# Patient Record
Sex: MALE | Race: WHITE | NOT HISPANIC OR LATINO | URBAN - METROPOLITAN AREA
[De-identification: names, ages, dates, MRNs, and addresses within clinical notes are randomized per-mention and may not be internally consistent; named-entity substitution may affect disease eponyms.]

---

## 2022-12-16 ENCOUNTER — INPATIENT (INPATIENT)
Facility: HOSPITAL | Age: 72
LOS: 4 days | Discharge: NURSING, REHAB OR LONG-TERM CARE | End: 2022-12-21
Attending: STUDENT IN AN ORGANIZED HEALTH CARE EDUCATION/TRAINING PROGRAM | Admitting: STUDENT IN AN ORGANIZED HEALTH CARE EDUCATION/TRAINING PROGRAM
Payer: MEDICARE

## 2022-12-16 VITALS
SYSTOLIC BLOOD PRESSURE: 165 MMHG | TEMPERATURE: 98 F | RESPIRATION RATE: 20 BRPM | HEIGHT: 65 IN | OXYGEN SATURATION: 96 % | HEART RATE: 83 BPM | DIASTOLIC BLOOD PRESSURE: 110 MMHG | WEIGHT: 250 LBS

## 2022-12-16 LAB
ALBUMIN SERPL ELPH-MCNC: 3.6 G/DL — SIGNIFICANT CHANGE UP (ref 3.5–5.2)
ALP SERPL-CCNC: 125 U/L — HIGH (ref 30–115)
ALT FLD-CCNC: 37 U/L — SIGNIFICANT CHANGE UP (ref 0–41)
ANION GAP SERPL CALC-SCNC: 8 MMOL/L — SIGNIFICANT CHANGE UP (ref 7–14)
ANISOCYTOSIS BLD QL: SLIGHT — SIGNIFICANT CHANGE UP
APTT BLD: 33.9 SEC — SIGNIFICANT CHANGE UP (ref 27–39.2)
AST SERPL-CCNC: 43 U/L — HIGH (ref 0–41)
BASOPHILS # BLD AUTO: 0 K/UL — SIGNIFICANT CHANGE UP (ref 0–0.2)
BASOPHILS NFR BLD AUTO: 0 % — SIGNIFICANT CHANGE UP (ref 0–1)
BILIRUB SERPL-MCNC: 0.8 MG/DL — SIGNIFICANT CHANGE UP (ref 0.2–1.2)
BUN SERPL-MCNC: 15 MG/DL — SIGNIFICANT CHANGE UP (ref 10–20)
CALCIUM SERPL-MCNC: 8.7 MG/DL — SIGNIFICANT CHANGE UP (ref 8.4–10.4)
CHLORIDE SERPL-SCNC: 99 MMOL/L — SIGNIFICANT CHANGE UP (ref 98–110)
CO2 SERPL-SCNC: 27 MMOL/L — SIGNIFICANT CHANGE UP (ref 17–32)
CREAT SERPL-MCNC: 1.1 MG/DL — SIGNIFICANT CHANGE UP (ref 0.7–1.5)
DACRYOCYTES BLD QL SMEAR: SLIGHT — SIGNIFICANT CHANGE UP
EGFR: 72 ML/MIN/1.73M2 — SIGNIFICANT CHANGE UP
EOSINOPHIL # BLD AUTO: 0.55 K/UL — SIGNIFICANT CHANGE UP (ref 0–0.7)
EOSINOPHIL NFR BLD AUTO: 5.3 % — SIGNIFICANT CHANGE UP (ref 0–8)
ETHANOL SERPL-MCNC: <10 MG/DL — SIGNIFICANT CHANGE UP
GIANT PLATELETS BLD QL SMEAR: PRESENT — SIGNIFICANT CHANGE UP
GLUCOSE SERPL-MCNC: 151 MG/DL — HIGH (ref 70–99)
HCT VFR BLD CALC: 43.4 % — SIGNIFICANT CHANGE UP (ref 42–52)
HGB BLD-MCNC: 15 G/DL — SIGNIFICANT CHANGE UP (ref 14–18)
INR BLD: 1.29 RATIO — SIGNIFICANT CHANGE UP (ref 0.65–1.3)
LACTATE SERPL-SCNC: 1.9 MMOL/L — SIGNIFICANT CHANGE UP (ref 0.7–2)
LIDOCAIN IGE QN: 99 U/L — HIGH (ref 7–60)
LYMPHOCYTES # BLD AUTO: 0.92 K/UL — LOW (ref 1.2–3.4)
LYMPHOCYTES # BLD AUTO: 8.8 % — LOW (ref 20.5–51.1)
MACROCYTES BLD QL: SLIGHT — SIGNIFICANT CHANGE UP
MANUAL SMEAR VERIFICATION: SIGNIFICANT CHANGE UP
MCHC RBC-ENTMCNC: 34.6 G/DL — SIGNIFICANT CHANGE UP (ref 32–37)
MCHC RBC-ENTMCNC: 35 PG — HIGH (ref 27–31)
MCV RBC AUTO: 101.2 FL — HIGH (ref 80–94)
MONOCYTES # BLD AUTO: 1.09 K/UL — HIGH (ref 0.1–0.6)
MONOCYTES NFR BLD AUTO: 10.5 % — HIGH (ref 1.7–9.3)
MYELOCYTES NFR BLD: 0.9 % — HIGH (ref 0–0)
NEUTROPHILS # BLD AUTO: 7.57 K/UL — HIGH (ref 1.4–6.5)
NEUTROPHILS NFR BLD AUTO: 70.2 % — SIGNIFICANT CHANGE UP (ref 42.2–75.2)
NEUTS BAND # BLD: 2.6 % — SIGNIFICANT CHANGE UP (ref 0–6)
NRBC # BLD: 2 /100 — HIGH (ref 0–0)
NRBC # BLD: SIGNIFICANT CHANGE UP /100 WBCS (ref 0–0)
PLAT MORPH BLD: NORMAL — SIGNIFICANT CHANGE UP
PLATELET # BLD AUTO: 112 K/UL — LOW (ref 130–400)
POTASSIUM SERPL-MCNC: 5 MMOL/L — SIGNIFICANT CHANGE UP (ref 3.5–5)
POTASSIUM SERPL-SCNC: 5 MMOL/L — SIGNIFICANT CHANGE UP (ref 3.5–5)
PROT SERPL-MCNC: 7.9 G/DL — SIGNIFICANT CHANGE UP (ref 6–8)
PROTHROM AB SERPL-ACNC: 14.8 SEC — HIGH (ref 9.95–12.87)
RBC # BLD: 4.29 M/UL — LOW (ref 4.7–6.1)
RBC # FLD: 13.1 % — SIGNIFICANT CHANGE UP (ref 11.5–14.5)
RBC BLD AUTO: ABNORMAL
SODIUM SERPL-SCNC: 134 MMOL/L — LOW (ref 135–146)
VARIANT LYMPHS # BLD: 1.7 % — SIGNIFICANT CHANGE UP (ref 0–5)
WBC # BLD: 10.4 K/UL — SIGNIFICANT CHANGE UP (ref 4.8–10.8)
WBC # FLD AUTO: 10.4 K/UL — SIGNIFICANT CHANGE UP (ref 4.8–10.8)

## 2022-12-16 PROCEDURE — 70450 CT HEAD/BRAIN W/O DYE: CPT | Mod: 26,MA

## 2022-12-16 PROCEDURE — 72125 CT NECK SPINE W/O DYE: CPT | Mod: 26,MA

## 2022-12-16 PROCEDURE — 73060 X-RAY EXAM OF HUMERUS: CPT | Mod: 26,RT

## 2022-12-16 PROCEDURE — 99285 EMERGENCY DEPT VISIT HI MDM: CPT | Mod: 57

## 2022-12-16 PROCEDURE — 99284 EMERGENCY DEPT VISIT MOD MDM: CPT

## 2022-12-16 PROCEDURE — 73100 X-RAY EXAM OF WRIST: CPT | Mod: 26,LT

## 2022-12-16 PROCEDURE — 73080 X-RAY EXAM OF ELBOW: CPT | Mod: 26,LT

## 2022-12-16 PROCEDURE — 71045 X-RAY EXAM CHEST 1 VIEW: CPT | Mod: 26

## 2022-12-16 PROCEDURE — 73090 X-RAY EXAM OF FOREARM: CPT | Mod: 26,LT

## 2022-12-16 PROCEDURE — 73130 X-RAY EXAM OF HAND: CPT | Mod: 26,LT

## 2022-12-16 PROCEDURE — 73030 X-RAY EXAM OF SHOULDER: CPT | Mod: 26,RT

## 2022-12-16 PROCEDURE — 25605 CLTX DST RDL FX/EPHYS SEP W/: CPT | Mod: 54

## 2022-12-16 PROCEDURE — 71260 CT THORAX DX C+: CPT | Mod: 26,MA

## 2022-12-16 PROCEDURE — 74177 CT ABD & PELVIS W/CONTRAST: CPT | Mod: 26,MA

## 2022-12-16 RX ORDER — PANTOPRAZOLE SODIUM 20 MG/1
1 TABLET, DELAYED RELEASE ORAL
Qty: 0 | Refills: 0 | DISCHARGE

## 2022-12-16 RX ORDER — RIFAXIMIN 200 MG/1
1 TABLET ORAL
Qty: 0 | Refills: 0 | DISCHARGE

## 2022-12-16 RX ORDER — LACTULOSE 10 G/15ML
30 SOLUTION ORAL
Qty: 0 | Refills: 0 | DISCHARGE

## 2022-12-16 RX ORDER — FUROSEMIDE 40 MG
1 TABLET ORAL
Qty: 0 | Refills: 0 | DISCHARGE

## 2022-12-16 RX ORDER — LEVOTHYROXINE SODIUM 125 MCG
1 TABLET ORAL
Qty: 0 | Refills: 0 | DISCHARGE

## 2022-12-16 RX ORDER — MORPHINE SULFATE 50 MG/1
8 CAPSULE, EXTENDED RELEASE ORAL ONCE
Refills: 0 | Status: DISCONTINUED | OUTPATIENT
Start: 2022-12-16 | End: 2022-12-16

## 2022-12-16 RX ORDER — CARVEDILOL PHOSPHATE 80 MG/1
1 CAPSULE, EXTENDED RELEASE ORAL
Qty: 0 | Refills: 0 | DISCHARGE

## 2022-12-16 RX ADMIN — MORPHINE SULFATE 8 MILLIGRAM(S): 50 CAPSULE, EXTENDED RELEASE ORAL at 21:13

## 2022-12-16 NOTE — ED PROVIDER NOTE - CARE PLAN
1 Principal Discharge DX:	Colles' fracture  Secondary Diagnosis:	Fracture of right humerus   Principal Discharge DX:	Colles' fracture  Secondary Diagnosis:	Fracture of right humerus  Secondary Diagnosis:	Inadequate social support

## 2022-12-16 NOTE — CONSULT NOTE ADULT - ASSESSMENT
ASSESSMENT:  72y Male  w/ PMHx of *** seen as (Code Trauma / Trauma Alert / Trauma Consult) s/p ****** with complaint of *** , external signs of trauma include *** . Trauma assessment in ED: ABCs intact , GCS 15 , AAOx3,  HU.     Injuries identified:   -   -   -     PLAN:   - Trauma Labs: (CBC, BMP, Coags, T&S, UA, EtOH level)  Additional studies:  EKG  Utox    Trauma Imaging to include the following:  - CXR, Pelvic Xray  - CT Head,  CT C-spine, CT Max/Face, CT Chest, CT Abd/Pelvis  - Extremity films: None    Additional consultations:  - Neurosurgery  - Orthopedics  - OMFS  - PT/Rehab/SW  - Hospitalist/Medicine     Disposition pending results of above labs and imaging  Above plan discussed with Trauma attending,  ***  , patient, patient family, and ED team  --------------------------------------------------------------------------------------  12-16-22 @ 21:01   72yM w/ PMHx of HTN, liver cirrhosis, gerd, hypothyroidism seen as Trauma Alert s/p +HT, -LOC, -AC with complaint of right shoulder pain and left wrist pain, external signs of trauma include: obvious deformity of left wrist, right shoulder and some dried blood to the left cheek. Trauma assessment in ED: ABCs intact, GCS 15, AAOx3,  HU.     Injuries identified:   - presumed right shoulder dislocation  - left wrist fracture    PLAN:   - Trauma Labs: (CBC, BMP, Coags, T&S, UA, EtOH level)  Additional studies:  EKG    Trauma Imaging to include the following: PAN SCAN  - CXR, Pelvic Xray  - CT Head,  CT C-spine, CT Chest, CT Abd/Pelvis  - Extremity films: R shoulder, L wrist    Additional consultations:      Disposition pending results of above labs and imaging  Above plan discussed with Trauma attending,  ***  , patient, patient family, and ED team  --------------------------------------------------------------------------------------  12-16-22 @ 21:01   72yM w/ PMHx of HTN, liver cirrhosis, gerd, hypothyroidism seen as Trauma Alert s/p +HT, -LOC, -AC with complaint of right shoulder pain and left wrist pain, external signs of trauma include: obvious deformity of left wrist, right shoulder and some dried blood to the left cheek. Trauma assessment in ED: ABCs intact, GCS 15, AAOx3    Injuries identified:   - Acute, impacted right humeral surgical neck comminuted fracture.  - left wrist fracture    PLAN:   - Trauma Labs: (CBC, BMP, Coags, T&S, UA, EtOH level)  Additional studies:  EKG    Trauma Imaging to include the following:   - CXR, Pelvic Xray  - CT Head,  CT C-spine, CT Chest, CT Abd/Pelvis  - Extremity films: R shoulder, L wrist    Additional consultations:  - ortho    Disposition pending results of above labs and imaging  Above plan discussed with Trauma attending, Dr. Mccarthy, patient, and ED team  --------------------------------------------------------------------------------------  12-16-22 @ 21:01   72yM w/ PMHx of HTN, liver cirrhosis, gerd, hypothyroidism seen as Trauma Alert s/p +HT, -LOC, -AC with complaint of right shoulder pain and left wrist pain, external signs of trauma include: obvious deformity of left wrist, right shoulder and some dried blood to the left cheek. Trauma assessment in ED: ABCs intact, GCS 15, AAOx3    Injuries identified:   - Acute, impacted right humeral surgical neck comminuted fracture.  - Left distal radius and distal ulna fracture    PLAN:   - Trauma Labs: (CBC, BMP, Coags, T&S, UA, EtOH level)  Additional studies:  EKG    Trauma Imaging to include the following:   - CXR, Pelvic Xray  - CT Head,  CT C-spine, CT Chest, CT Abd/Pelvis  - Extremity films: R shoulder, L wrist    Additional consultations:  - ortho    Disposition pending results of above labs and imaging  Above plan discussed with Trauma attending, Dr. Mccarthy, patient, and ED team  --------------------------------------------------------------------------------------  12-16-22 @ 21:01   72yM w/ PMHx of HTN, liver cirrhosis, gerd, hypothyroidism seen as Trauma Alert s/p +HT, -LOC, -AC with complaint of right shoulder pain and left wrist pain, external signs of trauma include: obvious deformity of left wrist, right shoulder and some dried blood to the left cheek. Trauma assessment in ED: ABCs intact, GCS 15, AAOx3    Injuries identified:   - Acute, impacted right humeral surgical neck comminuted fracture.  - Left Colles fracture    PLAN:   - Trauma Labs: (CBC, BMP, Coags, T&S, UA, EtOH level)  Additional studies:  EKG    Trauma Imaging to include the following:   - CXR, Pelvic Xray  - CT Head,  CT C-spine, CT Chest, CT Abd/Pelvis  - Extremity films: R shoulder, L wrist    Additional consultations:  - ortho    Disposition pending results of above labs and imaging  Above plan discussed with Trauma attending, Dr. Mccarthy, patient, and ED team  --------------------------------------------------------------------------------------  12-16-22 @ 21:01   72yM w/ PMHx of HTN, liver cirrhosis, gerd, hypothyroidism seen as Trauma Alert s/p +HT, -LOC, -AC with complaint of right shoulder pain and left wrist pain, external signs of trauma include: obvious deformity of left wrist, right shoulder and some dried blood to the left cheek. Trauma assessment in ED: ABCs intact, GCS 15, AAOx3    Injuries identified:   - Acute, impacted right humeral surgical neck comminuted fracture.  - Left Colles fracture    PLAN:   CTs reviewed, as above.  No acute trauma surgical intervention, nonoperative management of left and right upper extremity fractures.  Plain film wet reads as per ED  Dispo as per ED  If pt admitted, trauma team to perform TTS in AM    Above plan discussed with Trauma attending, Dr. Mccarthy, patient, and ED team

## 2022-12-16 NOTE — ED PROVIDER NOTE - ATTENDING CONTRIBUTION TO CARE
I personally evaluated patient. I agree with the findings and plan with all documentation on chart except as documented  in my note.    72-year-old male past medical history of cirrhosis secondary to prior alcohol abuse and dependence, hypertension, hypothyroidism who presents to the emergency department as a prior arrival trauma notification for fall.  Trauma alert activated prior to arrival.  Patient was bringing the garbage out when he tripped forward and fell on his right shoulder and left outstretched hand.  Patient had left wrist deformity and right upper arm deformity and EMS was called.  He denies any loss of consciousness.  Additional history obtained from EMS and patient had life alert button.  More history later taken from patient's daughter.    Patient brought to trauma critical care ED.  Vital signs reviewed.  GCS 15.  Primary survey is intact.  Secondary survey shows right upper arm shortening and tenderness to surgical neck of humerus.  Patient also has Colles' fracture to the left distal forearm.  Patient is neurovascular intact throughout and has normal pulses and cap refill, sensation.  Remainder secondary survey negative for any serious injuries.  Patient had full trauma work-up and appropriate x-rays performed.  Trauma work-up consistent with right humeral neck fracture, for which patient had sling applied.  Patient also has a left Colles' fracture that was significantly compacted and displaced.  This was reduced as described in the emergency department and patient splinted.  Repeat x-rays performed and showed significant improvement in alignment of fracture.  Case discussed with trauma.  Patient's daughter spoken to and reports patient is unsafe to be at home at this time.  Patient has injuries to bilateral upper extremities and cannot take care of himself.  Daughter works long hours and is not home for patient and cannot help patient will require social admission for possible placement and social work evaluation.  Blood work reviewed.  Orthopedics consulted for care and patient has nonoperative shoulder fracture.  Patient cleared from trauma for admission to medicine.  Daughter spoken to about results and plan of care and patient's medications updated.

## 2022-12-16 NOTE — CONSULT NOTE ADULT - SUBJECTIVE AND OBJECTIVE BOX
TRAUMA ACTIVATION LEVEL: ALERT  ACTIVATED BY: EMS  INTUBATED: NO    MECHANISM OF INJURY: [X] Fall	    GCS: 15 	E: 4	V: 5	M: 6    HPI:  72yM w/ PMHx of HTN, liver cirrhosis, chf, gerd, hypothyroidism seen as Trauma Alert s/p -HT, -LOC, -AC.  Trauma assessment in ED: ABCs intact , GCS 15 , AAOx3. Patient is complaining of right shoulder pain and left wrist pain. Obvious deformity of left wrist.    PAST MEDICAL & SURGICAL HISTORY:    Allergies  No Known Allergies    Home Medications:  Carvedilol  spirinolactone  pantoprazole  vit d3  furosemide  levothyroxine  lactulose    ROS: 10-system review is otherwise negative except HPI above.      Primary Survey:    A - airway intact  B - bilateral breath sounds and good chest rise  C - palpable pulses in all extremities  D - GCS 15 on arrival, HU  Exposure obtained    Vital Signs Last 24 Hrs  T(C): --  T(F): --  HR: --  BP: --  BP(mean): --  RR: --  SpO2: --    Secondary Survey:   General: NAD  HEENT: Normocephalic, atraumatic, EOMI, PEERLA. no scalp lacerations   Neck: Soft, midline trachea. no c-spine tenderness  Chest: Mild left chest wall tenderness. No subcutaneous emphysema   Cardiac: S1, S2  Respiratory: Bilateral breath sounds, clear and equal bilaterally  Abdomen: Soft, non-distended, non-tender, no rebound, no guarding.  Groin: Normal appearing, pelvis stable   Ext:  Sensation and pulses intact in all 4 extremities.   Back: No T/L/S spine tenderness, No palpable runoff/stepoff/deformity  Rectal: No bessy blood, SHLOMO with good tone    Labs:  CAPILLARY BLOOD GLUCOSE  POCT Blood Glucose.: 153 mg/dL (16 Dec 2022 20:59)      RADIOLOGY & ADDITIONAL STUDIES: TRAUMA ACTIVATION LEVEL: ALERT  ACTIVATED BY: EMS  INTUBATED: NO    MECHANISM OF INJURY: [X] Fall	    GCS: 15 	E: 4	V: 5	M: 6    HPI:  72yM w/ PMHx of HTN, liver cirrhosis, gerd, hypothyroidism seen as Trauma Alert s/p +HT, -LOC, -AC.  Trauma assessment in ED: ABCs intact , GCS 15 , AAOx3. Patient is complaining of right shoulder pain and left wrist pain. Obvious deformity of left wrist. Patient states he had a mechanical trip and fall while taking out the trash. He was able to press his life alert and EMS came. He was unable to stand on his own but states he usually walks with a cane by himself at home.     PAST MEDICAL & SURGICAL HISTORY:    Allergies  No Known Allergies    Home Medications:  Carvedilol  spirinolactone  pantoprazole  vit d3  furosemide  levothyroxine  lactulose      ROS: 10-system review is otherwise negative except HPI above.      Primary Survey:    A - airway intact  B - bilateral breath sounds and good chest rise  C - palpable pulses in all extremities  D - GCS 15 on arrival, HU  Exposure obtained    Vital Signs Last 24 Hrs  T(C): 36.4 (16 Dec 2022 21:09), Max: 36.4 (16 Dec 2022 21:09)  T(F): 97.5 (16 Dec 2022 21:09), Max: 97.5 (16 Dec 2022 21:09)  HR: 87 (16 Dec 2022 21:15) (83 - 87)  BP: 126/63 (16 Dec 2022 21:15) (126/63 - 165/110)  BP(mean): --  RR: 20 (16 Dec 2022 21:15) (20 - 20)  SpO2: 96% (16 Dec 2022 21:15) (96% - 96%)    Parameters below as of 16 Dec 2022 21:15  Patient On (Oxygen Delivery Method): room air      Secondary Survey:   General: NAD  HEENT: Normocephalic, atraumatic, EOMI, PEERLA. no scalp lacerations   Neck: Soft, midline trachea. no c-spine tenderness  Chest: Mild left chest wall tenderness. No subcutaneous emphysema   Cardiac: S1, S2  Respiratory: Bilateral breath sounds, clear and equal bilaterally  Abdomen: Soft, non-distended, non-tender, no rebound, no guarding.  Groin: Normal appearing, pelvis stable   Ext:  Sensation and pulses intact in all 4 extremities.   Back: No T/L/S spine tenderness, No palpable runoff/stepoff/deformity  Rectal: No bessy blood, SHLOMO with good tone    Labs:  CAPILLARY BLOOD GLUCOSE  POCT Blood Glucose.: 153 mg/dL (16 Dec 2022 20:59)                        15.0   10.40 )-----------( 112      ( 16 Dec 2022 21:14 )             43.4       RADIOLOGY & ADDITIONAL STUDIES:  PAN SCAN pending TRAUMA ACTIVATION LEVEL: ALERT  ACTIVATED BY: EMS  INTUBATED: NO    MECHANISM OF INJURY: [X] Fall	    GCS: 15 	E: 4	V: 5	M: 6    HPI:  72yM w/ PMHx of HTN, liver cirrhosis 2/2 EtOH abuse states he quit drinking 5 years ago, GERD, hypothyroidism seen as Trauma Alert s/p +HT, -LOC, -AC.  Trauma assessment in ED: ABCs intact , GCS 15 , AAOx3. Patient is complaining of right shoulder pain and left wrist pain. Obvious deformity of left wrist. Patient states he had a mechanical trip and fall while taking out the trash. He was able to press his life alert and EMS came. He was unable to stand on his own but states he usually walks with a cane by himself at home.     PAST MEDICAL & SURGICAL HISTORY:  HTN   Liver cirrhosis 2/2 EtOH abuse states he quit drinking 5 years ago   GERD  Hypothyroidism    Allergies  No Known Allergies    Home Medications:  Carvedilol  spironolactone  pantoprazole  vit d3  furosemide  levothyroxine  lactulose    ROS: 10-system review is otherwise negative except HPI above.      Primary Survey:    A - airway intact  B - bilateral breath sounds and good chest rise  C - palpable pulses in all extremities  D - GCS 15 on arrival, HU  Exposure obtained    Vital Signs Last 24 Hrs  T(C): 36.4 (16 Dec 2022 21:09), Max: 36.4 (16 Dec 2022 21:09)  T(F): 97.5 (16 Dec 2022 21:09), Max: 97.5 (16 Dec 2022 21:09)  HR: 87 (16 Dec 2022 21:15) (83 - 87)  BP: 126/63 (16 Dec 2022 21:15) (126/63 - 165/110)  BP(mean): --  RR: 20 (16 Dec 2022 21:15) (20 - 20)  SpO2: 96% (16 Dec 2022 21:15) (96% - 96%)    Parameters below as of 16 Dec 2022 21:15  Patient On (Oxygen Delivery Method): room air    Secondary Survey:   General: NAD  HEENT: Normocephalic, atraumatic, EOMI, PEERLA. no scalp lacerations   Neck: Soft, midline trachea. no c-spine tenderness  Chest: Mild left chest wall tenderness. No subcutaneous emphysema   Cardiac: S1, S2  Respiratory: Bilateral breath sounds, clear and equal bilaterally  Abdomen: Soft, non-distended, non-tender, no rebound, no guarding.  Groin: Normal appearing, pelvis stable   Ext:  Sensation and pulses intact in all 4 extremities.   Back: No T/L/S spine tenderness, No palpable runoff/stepoff/deformity  Rectal: No bessy blood, SHLOMO with good tone      LABS:  CAPILLARY BLOOD GLUCOSE  POCT Blood Glucose.: 153 mg/dL (16 Dec 2022 20:59)                      15.0   10.40 )-----------( 112      ( 16 Dec 2022 21:14 )             43.4     12-16  134<L>  |  99  |  15  ----------------------------<  151<H>  5.0   |  27  |  1.1    Ca    8.7      16 Dec 2022 21:14    TPro  7.9  /  Alb  3.6  /  TBili  0.8  /  DBili  x   /  AST  43<H>  /  ALT  37  /  AlkPhos  125<H>  12-16    Lipase, Serum: 99 U/L (12-16-22 @ 21:14)    Lactate, Blood: 1.9 mmol/L (12-16-22 @ 21:14)    PT/INR - ( 16 Dec 2022 21:14 )   PT: 14.80 sec;   INR: 1.29 ratio    PTT - ( 16 Dec 2022 21:14 )  PTT:33.9 sec    Alcohol, Blood: <10 mg/dL (12-16-22 @ 21:14)      RADIOLOGY & ADDITIONAL STUDIES:  PAN SCAN pending TRAUMA ACTIVATION LEVEL: ALERT  ACTIVATED BY: EMS  INTUBATED: NO    MECHANISM OF INJURY: [X] Fall	    GCS: 15 	E: 4	V: 5	M: 6    HPI:  72yM w/ PMHx of HTN, liver cirrhosis 2/2 EtOH abuse states he quit drinking 5 years ago, GERD, hypothyroidism seen as Trauma Alert s/p +HT, -LOC, -AC.  Trauma assessment in ED: ABCs intact , GCS 15 , AAOx3. Patient is complaining of right shoulder pain and left wrist pain. Obvious deformity of left wrist. Patient states he had a mechanical trip and fall while taking out the trash. He was able to press his life alert and EMS came. He was unable to stand on his own but states he usually walks with a cane by himself at home.     PAST MEDICAL & SURGICAL HISTORY:  HTN   Liver cirrhosis 2/2 EtOH abuse states he quit drinking 5 years ago   GERD  Hypothyroidism    Allergies  No Known Allergies    Home Medications:  Carvedilol  spironolactone  pantoprazole  vit d3  furosemide  levothyroxine  lactulose    ROS: 10-system review is otherwise negative except HPI above.      Primary Survey:    A - airway intact  B - bilateral breath sounds and good chest rise  C - palpable pulses in all extremities  D - GCS 15 on arrival, HU  Exposure obtained    Vital Signs Last 24 Hrs  T(C): 36.4 (16 Dec 2022 21:09), Max: 36.4 (16 Dec 2022 21:09)  T(F): 97.5 (16 Dec 2022 21:09), Max: 97.5 (16 Dec 2022 21:09)  HR: 87 (16 Dec 2022 21:15) (83 - 87)  BP: 126/63 (16 Dec 2022 21:15) (126/63 - 165/110)  BP(mean): --  RR: 20 (16 Dec 2022 21:15) (20 - 20)  SpO2: 96% (16 Dec 2022 21:15) (96% - 96%)    Parameters below as of 16 Dec 2022 21:15  Patient On (Oxygen Delivery Method): room air    Secondary Survey:   General: NAD  HEENT: Normocephalic, atraumatic, EOMI, PEERLA. no scalp lacerations   Neck: Soft, midline trachea. no c-spine tenderness  Chest: Mild left chest wall tenderness. No subcutaneous emphysema   Cardiac: S1, S2  Respiratory: Bilateral breath sounds, clear and equal bilaterally  Abdomen: Soft, non-distended, non-tender, no rebound, no guarding.  Groin: Normal appearing, pelvis stable   Ext:  Sensation and pulses intact in all 4 extremities.   Back: No T/L/S spine tenderness, No palpable runoff/stepoff/deformity  Rectal: No bessy blood, SHLOMO with good tone      LABS:  CAPILLARY BLOOD GLUCOSE  POCT Blood Glucose.: 153 mg/dL (16 Dec 2022 20:59)                      15.0   10.40 )-----------( 112      ( 16 Dec 2022 21:14 )             43.4     12-16  134<L>  |  99  |  15  ----------------------------<  151<H>  5.0   |  27  |  1.1    Ca    8.7      16 Dec 2022 21:14    TPro  7.9  /  Alb  3.6  /  TBili  0.8  /  DBili  x   /  AST  43<H>  /  ALT  37  /  AlkPhos  125<H>  12-16    Lipase, Serum: 99 U/L (12-16-22 @ 21:14)    Lactate, Blood: 1.9 mmol/L (12-16-22 @ 21:14)    PT/INR - ( 16 Dec 2022 21:14 )   PT: 14.80 sec;   INR: 1.29 ratio    PTT - ( 16 Dec 2022 21:14 )  PTT:33.9 sec    Alcohol, Blood: <10 mg/dL (12-16-22 @ 21:14)      RADIOLOGY & ADDITIONAL STUDIES:  < from: CT Head No Cont (12.16.22 @ 22:02) >  Impression:  CT head:  No evidence of acute intracranial abnormality.  CT cervical spine:  No definite evidence of acute fracture of the cervical spine.  Osteopenia. C3-C7 mild vertebral body height loss, likely chronic.    --- End of Report ---  SOLO MAST MD; Attending Radiologist  This document has been electronically signed. Dec 16 2022 10:50PM  < end of copied text >    < from: CT Chest w/ IV Cont (12.16.22 @ 22:19) >  IMPRESSION:  1. Acute, impacted right humeral surgical neck comminuted fracture.  2. Osteopenia with age-indeterminate L1-L5, T12, T10, T7-T8, T3 vertebral body compression fractures; likely chronic.  3. No evidence of solid abdominal organ injury.  4. Liver cirrhosis with portal hypertension. Patent portal vein.    --- End of Report ---  SOLO MAST MD; Attending Radiologist  This document has been electronically signed. Dec 16 2022 10:39PM  < end of copied text > TRAUMA ACTIVATION LEVEL: ALERT  ACTIVATED BY: EMS  INTUBATED: NO    MECHANISM OF INJURY: [X] Fall	    GCS: 15 	E: 4	V: 5	M: 6    HPI:  72yM w/ PMHx of HTN, liver cirrhosis 2/2 EtOH abuse states he quit drinking 5 years ago, GERD, hypothyroidism seen as Trauma Alert s/p +HT, -LOC, -AC.  Trauma assessment in ED: ABCs intact , GCS 15 , AAOx3. Patient is complaining of right shoulder pain and left wrist pain. Obvious deformity of left wrist. Patient states he had a mechanical trip and fall while taking out the trash. He was able to press his life alert and EMS came. He was unable to stand on his own but states he usually walks with a cane by himself at home.     PAST MEDICAL & SURGICAL HISTORY:  HTN   Liver cirrhosis 2/2 EtOH abuse states he quit drinking 5 years ago   GERD  Hypothyroidism    Allergies  No Known Allergies    Home Medications:  Carvedilol  spironolactone  pantoprazole  vit d3  furosemide  levothyroxine  lactulose    ROS: 10-system review is otherwise negative except HPI above.      Primary Survey:    A - airway intact  B - bilateral breath sounds and good chest rise  C - palpable pulses in all extremities  D - GCS 15 on arrival, HU  Exposure obtained    Vital Signs Last 24 Hrs  T(C): 36.4 (16 Dec 2022 21:09), Max: 36.4 (16 Dec 2022 21:09)  T(F): 97.5 (16 Dec 2022 21:09), Max: 97.5 (16 Dec 2022 21:09)  HR: 87 (16 Dec 2022 21:15) (83 - 87)  BP: 126/63 (16 Dec 2022 21:15) (126/63 - 165/110)  BP(mean): --  RR: 20 (16 Dec 2022 21:15) (20 - 20)  SpO2: 96% (16 Dec 2022 21:15) (96% - 96%)    Parameters below as of 16 Dec 2022 21:15  Patient On (Oxygen Delivery Method): room air    Secondary Survey:   General: NAD  HEENT: Normocephalic, atraumatic, EOMI, PEERLA. no scalp lacerations   Neck: Soft, midline trachea. no c-spine tenderness  Chest: Mild left chest wall tenderness. No subcutaneous emphysema   Cardiac: S1, S2  Respiratory: Bilateral breath sounds, clear and equal bilaterally  Abdomen: Soft, non-distended, non-tender, no rebound, no guarding.  Groin: Normal appearing, pelvis stable   Ext:  Sensation and pulses intact in all 4 extremities.   Back: No T/L/S spine tenderness, No palpable runoff/stepoff/deformity  Rectal: No bessy blood, SHLOMO with good tone      LABS:  CAPILLARY BLOOD GLUCOSE  POCT Blood Glucose.: 153 mg/dL (16 Dec 2022 20:59)                      15.0   10.40 )-----------( 112      ( 16 Dec 2022 21:14 )             43.4     12-16  134<L>  |  99  |  15  ----------------------------<  151<H>  5.0   |  27  |  1.1    Ca    8.7      16 Dec 2022 21:14    TPro  7.9  /  Alb  3.6  /  TBili  0.8  /  DBili  x   /  AST  43<H>  /  ALT  37  /  AlkPhos  125<H>  12-16    Lipase, Serum: 99 U/L (12-16-22 @ 21:14)    Lactate, Blood: 1.9 mmol/L (12-16-22 @ 21:14)    PT/INR - ( 16 Dec 2022 21:14 )   PT: 14.80 sec;   INR: 1.29 ratio    PTT - ( 16 Dec 2022 21:14 )  PTT:33.9 sec    Alcohol, Blood: <10 mg/dL (12-16-22 @ 21:14)      RADIOLOGY & ADDITIONAL STUDIES:  < from: CT Head No Cont (12.16.22 @ 22:02) >  Impression:  CT head:  No evidence of acute intracranial abnormality.  CT cervical spine:  No definite evidence of acute fracture of the cervical spine.  Osteopenia. C3-C7 mild vertebral body height loss, likely chronic.    --- End of Report ---  SOLO MAST MD; Attending Radiologist  This document has been electronically signed. Dec 16 2022 10:50PM  < end of copied text >    < from: CT Chest w/ IV Cont (12.16.22 @ 22:19) >  IMPRESSION:  1. Acute, impacted right humeral surgical neck comminuted fracture.  2. Osteopenia with age-indeterminate L1-L5, T12, T10, T7-T8, T3 vertebral body compression fractures; likely chronic.  3. No evidence of solid abdominal organ injury.  4. Liver cirrhosis with portal hypertension. Patent portal vein.    --- End of Report ---  SOLO MAST MD; Attending Radiologist  This document has been electronically signed. Dec 16 2022 10:39PM  < end of copied text >      5-item FRAIL scale (Fatigue, Resistance, Ambulation, Illnesses, & Loss of Weight)  - Fatigue: How much time during the previous 4 weeks did you feel tired?   All or most of the time [  x ] Yes (1pt)    [  ] No  (0pts)  - Resistance: Do you have any difficulty walking up 10 steps alone without resting and without aids? [ x  ] Yes (1pt)    [  ] No  (0pts)  - Ambulation: Do you have any difficulty walking several hundred yards alone without aids? [ x  ] Yes (1pt)    [  ] No  (0pts)  - Illness: Do you have 5+ medical problems? [ x  ] 5 or more (1pt) [  ] < 5 (0pts)  (The total illnesses (0–11) are recoded as 0–4 = 0 and 5–11 = 1. The illnesses include hypertension, diabetes, cancer (other than a minor skin cancer), chronic lung disease, heart attack, congestive heart failure, angina, asthma, arthritis, stroke, and kidney disease.)    - Loss of weight: Have you had weight loss of 5% or more? [   ] Yes (1pt)    [ x ] No  (0pts)    Total Score: 4    Score 1-2: Consult medical comangement  Score 3-4: Consult Geriatric service   Score 5: Consult Palliative service [phone]    Gifty Godinez G, Jatin RIVERA, Lorie WARD, Zhanna B. Frality: toward a clinical definition. J [employer] Med Dir Assoc. 2008; 9 (2): 71-72  Gabriel JE, Zenon TK, Martinez DK. A simple frailty questionnaire (FRAIL) predicts outcomes in middle aged  Americans. J Nutr Health Aging. 2012; 16 (7): 601-608

## 2022-12-16 NOTE — ED ADULT TRIAGE NOTE - CHIEF COMPLAINT QUOTE
BIBA with complaints of S/P Fall, tripped and fell at home with right upper arm/shoulder and left wrist pain, no LOC, no blood thinners.

## 2022-12-16 NOTE — ED PROVIDER NOTE - CLINICAL SUMMARY MEDICAL DECISION MAKING FREE TEXT BOX
72-year-old male past medical history of cirrhosis secondary to prior alcohol abuse and dependence, hypertension, hypothyroidism who presents to the emergency department as a prior arrival trauma notification for fall.  Trauma alert activated prior to arrival.  Patient was bringing the garbage out when he tripped forward and fell on his right shoulder and left outstretched hand.  Patient had left wrist deformity and right upper arm deformity and EMS was called.  He denies any loss of consciousness.  Additional history obtained from EMS and patient had life alert button.  More history later taken from patient's daughter.    Patient brought to trauma critical care ED.  Vital signs reviewed.  GCS 15.  Primary survey is intact.  Secondary survey shows right upper arm shortening and tenderness to surgical neck of humerus.  Patient also has Colles' fracture to the left distal forearm.  Patient is neurovascular intact throughout and has normal pulses and cap refill, sensation.  Remainder secondary survey negative for any serious injuries.  Patient had full trauma work-up and appropriate x-rays performed.  Trauma work-up consistent with right humeral neck fracture, for which patient had sling applied.  Patient also has a left Colles' fracture that was significantly compacted and displaced.  This was reduced as described in the emergency department and patient splinted.  Repeat x-rays performed and showed significant improvement in alignment of fracture.  Case discussed with trauma.  Patient's daughter spoken to and reports patient is unsafe to be at home at this time.  Patient has injuries to bilateral upper extremities and cannot take care of himself.  Daughter works long hours and is not home for patient and cannot help patient will require social admission for possible placement and social work evaluation.  Blood work reviewed.  Orthopedics consulted for care and patient has nonoperative shoulder fracture.  Patient cleared from trauma for admission to medicine.  Daughter spoken to about results and plan of care and patient's medications updated.

## 2022-12-16 NOTE — ED PROVIDER NOTE - OBJECTIVE STATEMENT
Pt is a 71 y/o male with PMH of alcohol use disorder, cirrhosis, HTN, hypothyroidism and GERD BIBEMS for fall. Pt was at home in his yard when he tripped and fell. + head trauma, no LOC, no blood thinners. Pt is a 73 y/o male with PMH of alcohol use disorder (has been sober for ~5 yrs), cirrhosis, HTN, hypothyroidism and GERD BIBEMS for fall. Pt was at home in his yard when he tripped and fell. + head trauma, no LOC, no blood thinners. Reports pain to right shoulder and left wrist.

## 2022-12-17 LAB
ALBUMIN SERPL ELPH-MCNC: 3.4 G/DL — LOW (ref 3.5–5.2)
ALP SERPL-CCNC: 119 U/L — HIGH (ref 30–115)
ALT FLD-CCNC: 32 U/L — SIGNIFICANT CHANGE UP (ref 0–41)
ANION GAP SERPL CALC-SCNC: 10 MMOL/L — SIGNIFICANT CHANGE UP (ref 7–14)
AST SERPL-CCNC: 33 U/L — SIGNIFICANT CHANGE UP (ref 0–41)
BASOPHILS # BLD AUTO: 0.05 K/UL — SIGNIFICANT CHANGE UP (ref 0–0.2)
BASOPHILS NFR BLD AUTO: 0.4 % — SIGNIFICANT CHANGE UP (ref 0–1)
BILIRUB SERPL-MCNC: 1 MG/DL — SIGNIFICANT CHANGE UP (ref 0.2–1.2)
BUN SERPL-MCNC: 16 MG/DL — SIGNIFICANT CHANGE UP (ref 10–20)
CALCIUM SERPL-MCNC: 8.3 MG/DL — LOW (ref 8.4–10.5)
CHLORIDE SERPL-SCNC: 99 MMOL/L — SIGNIFICANT CHANGE UP (ref 98–110)
CO2 SERPL-SCNC: 25 MMOL/L — SIGNIFICANT CHANGE UP (ref 17–32)
CREAT SERPL-MCNC: 0.8 MG/DL — SIGNIFICANT CHANGE UP (ref 0.7–1.5)
EGFR: 95 ML/MIN/1.73M2 — SIGNIFICANT CHANGE UP
EOSINOPHIL # BLD AUTO: 0.02 K/UL — SIGNIFICANT CHANGE UP (ref 0–0.7)
EOSINOPHIL NFR BLD AUTO: 0.2 % — SIGNIFICANT CHANGE UP (ref 0–8)
GLUCOSE SERPL-MCNC: 129 MG/DL — HIGH (ref 70–99)
HCT VFR BLD CALC: 40.4 % — LOW (ref 42–52)
HGB BLD-MCNC: 14 G/DL — SIGNIFICANT CHANGE UP (ref 14–18)
IMM GRANULOCYTES NFR BLD AUTO: 0.4 % — HIGH (ref 0.1–0.3)
LYMPHOCYTES # BLD AUTO: 1.11 K/UL — LOW (ref 1.2–3.4)
LYMPHOCYTES # BLD AUTO: 8.7 % — LOW (ref 20.5–51.1)
MCHC RBC-ENTMCNC: 34.7 G/DL — SIGNIFICANT CHANGE UP (ref 32–37)
MCHC RBC-ENTMCNC: 35.3 PG — HIGH (ref 27–31)
MCV RBC AUTO: 101.8 FL — HIGH (ref 80–94)
MONOCYTES # BLD AUTO: 1.43 K/UL — HIGH (ref 0.1–0.6)
MONOCYTES NFR BLD AUTO: 11.3 % — HIGH (ref 1.7–9.3)
NEUTROPHILS # BLD AUTO: 10.05 K/UL — HIGH (ref 1.4–6.5)
NEUTROPHILS NFR BLD AUTO: 79 % — HIGH (ref 42.2–75.2)
NRBC # BLD: 0 /100 WBCS — SIGNIFICANT CHANGE UP (ref 0–0)
PLATELET # BLD AUTO: 110 K/UL — LOW (ref 130–400)
POTASSIUM SERPL-MCNC: 4 MMOL/L — SIGNIFICANT CHANGE UP (ref 3.5–5)
POTASSIUM SERPL-SCNC: 4 MMOL/L — SIGNIFICANT CHANGE UP (ref 3.5–5)
PROT SERPL-MCNC: 7.3 G/DL — SIGNIFICANT CHANGE UP (ref 6–8)
RBC # BLD: 3.97 M/UL — LOW (ref 4.7–6.1)
RBC # FLD: 13.1 % — SIGNIFICANT CHANGE UP (ref 11.5–14.5)
SARS-COV-2 RNA SPEC QL NAA+PROBE: SIGNIFICANT CHANGE UP
SODIUM SERPL-SCNC: 134 MMOL/L — LOW (ref 135–146)
WBC # BLD: 12.71 K/UL — HIGH (ref 4.8–10.8)
WBC # FLD AUTO: 12.71 K/UL — HIGH (ref 4.8–10.8)

## 2022-12-17 PROCEDURE — 73110 X-RAY EXAM OF WRIST: CPT | Mod: 26,LT,77

## 2022-12-17 PROCEDURE — 99223 1ST HOSP IP/OBS HIGH 75: CPT

## 2022-12-17 PROCEDURE — 73110 X-RAY EXAM OF WRIST: CPT | Mod: 26,LT

## 2022-12-17 RX ORDER — FUROSEMIDE 40 MG
20 TABLET ORAL DAILY
Refills: 0 | Status: DISCONTINUED | OUTPATIENT
Start: 2022-12-17 | End: 2022-12-21

## 2022-12-17 RX ORDER — SPIRONOLACTONE 25 MG/1
1 TABLET, FILM COATED ORAL
Qty: 0 | Refills: 0 | DISCHARGE

## 2022-12-17 RX ORDER — LEVOTHYROXINE SODIUM 125 MCG
125 TABLET ORAL DAILY
Refills: 0 | Status: DISCONTINUED | OUTPATIENT
Start: 2022-12-17 | End: 2022-12-21

## 2022-12-17 RX ORDER — LACTULOSE 10 G/15ML
30 SOLUTION ORAL THREE TIMES A DAY
Refills: 0 | Status: DISCONTINUED | OUTPATIENT
Start: 2022-12-17 | End: 2022-12-21

## 2022-12-17 RX ORDER — CARVEDILOL PHOSPHATE 80 MG/1
3.12 CAPSULE, EXTENDED RELEASE ORAL EVERY 12 HOURS
Refills: 0 | Status: DISCONTINUED | OUTPATIENT
Start: 2022-12-17 | End: 2022-12-21

## 2022-12-17 RX ORDER — PANTOPRAZOLE SODIUM 20 MG/1
40 TABLET, DELAYED RELEASE ORAL
Refills: 0 | Status: DISCONTINUED | OUTPATIENT
Start: 2022-12-17 | End: 2022-12-21

## 2022-12-17 RX ORDER — ENOXAPARIN SODIUM 100 MG/ML
40 INJECTION SUBCUTANEOUS EVERY 24 HOURS
Refills: 0 | Status: DISCONTINUED | OUTPATIENT
Start: 2022-12-17 | End: 2022-12-21

## 2022-12-17 RX ORDER — SPIRONOLACTONE 25 MG/1
50 TABLET, FILM COATED ORAL DAILY
Refills: 0 | Status: DISCONTINUED | OUTPATIENT
Start: 2022-12-17 | End: 2022-12-21

## 2022-12-17 RX ADMIN — ENOXAPARIN SODIUM 40 MILLIGRAM(S): 100 INJECTION SUBCUTANEOUS at 12:01

## 2022-12-17 RX ADMIN — LACTULOSE 30 GRAM(S): 10 SOLUTION ORAL at 21:26

## 2022-12-17 RX ADMIN — MORPHINE SULFATE 8 MILLIGRAM(S): 50 CAPSULE, EXTENDED RELEASE ORAL at 00:16

## 2022-12-17 RX ADMIN — CARVEDILOL PHOSPHATE 3.12 MILLIGRAM(S): 80 CAPSULE, EXTENDED RELEASE ORAL at 17:33

## 2022-12-17 NOTE — CONSULT NOTE ADULT - SUBJECTIVE AND OBJECTIVE BOX
Orthopaedics Consult Note    EUGENIA TALBERT  [id number]    Patient is a 72y year old Male tripped and fell un an uneven sidewalk yesterday evening. Ssuatined superficial abrasions of the face, denies LOC. Has pain in the right shoulder and left wrist. Imaging demonstrated right proximl humerus fracture and left distal radius fracture. Denies numbness/tingling    PMH/PSH  COLLES&#x27; FRACTURE; FRACTURE OF RIGHT HUMERUS; INADEQUATE SOCIAL SUPPORT    ^COLLES&#x27; FRACTURE; FRACTURE OF RIGHT HUMERUS; INADEQUATE SOCIAL SUPPORT    Handoff    MEWS Score    Cirrhosis    Hypothyroidism    GERD (gastroesophageal reflux disease)    Colles&#x27; fracture    FALL    Fracture of right humerus    Inadequate social support    [email or web address]        Medications  enoxaparin Injectable 40 milliGRAM(s) SubCutaneous every 24 hours      Allergies  No Known Allergies        T(C): 36.4 (12-17-22 @ 07:53), Max: 36.7 (12-17-22 @ 03:20)  HR: 77 (12-17-22 @ 07:53) (74 - 87)  BP: 132/66 (12-17-22 @ 07:53) (120/62 - 165/110)  RR: 18 (12-17-22 @ 07:53) (18 - 20)  SpO2: 97% (12-17-22 @ 07:53) (96% - 98%)    Physical Exam  NAD  Breathing comfortably on RA  Resting comfortably    RUE  skin intact  +swelling proximal arm  +ttp prox arm  No gross deformity   Motor: AIN/PIN/Ulnar intact  Sensory: Ax/M/R/U intact  Vasc: hand WWP, 2+ radial pulse    LUE  splint in place  +swelling wrist  +ttp wrist  Motor: AIN/PIN/Ulnar intact  Sensory: Ax/M/R/U intact  Vasc: hand WWP      Labs                        15.0   10.40 )-----------( 112      ( 16 Dec 2022 21:14 )             43.4     12-16    134<L>  |  99  |  15  ----------------------------<  151<H>  5.0   |  27  |  1.1    Ca    8.7      16 Dec 2022 21:14    TPro  7.9  /  Alb  3.6  /  TBili  0.8  /  DBili  x   /  AST  43<H>  /  ALT  37  /  AlkPhos  125<H>  12-16    LIVER FUNCTIONS - ( 16 Dec 2022 21:14 )  Alb: 3.6 g/dL / Pro: 7.9 g/dL / ALK PHOS: 125 U/L / ALT: 37 U/L / AST: 43 U/L / GGT: x           PT/INR - ( 16 Dec 2022 21:14 )   PT: 14.80 sec;   INR: 1.29 ratio         PTT - ( 16 Dec 2022 21:14 )  PTT:33.9 sec    Img  XR right sholder, humerus: proximal humerus fracture  XR left wrist, elbow: large displaced radial styloid fracture of distal radius      A/P: Patient is a 72y year old Male with right proximal humerus fracture placed in sling by ED with fracture morphology that appears amenable to non-operative management. Left distal radius fracture reduced by ED. Needs better post reduction XRs (true AP) to assess reducion    NWB RUE, NWB LUE  sling RUE  XR left wrist, possible re-reduction   pain control   Return to clinic: Orthopaedic office with Dr. Seo, please call [phone] to schedule an appointment   Return to ED with uncontrolled pain/bleeding/fever/chills/numbness/tingling/cool extremity/inability to move extremity

## 2022-12-17 NOTE — CHART NOTE - NSCHARTNOTEFT_GEN_A_CORE
Tertiary Trauma Survey (TTS)    Date of TTS: 12-17-22 @ 10:45   Admit Date: 12-17-22  Trauma Activation: ALERT  Admit GCS: 15  E: 4  V: 5  M: 6     HPI:  72 year old patient, known to have history of cirrhosis secondary to prior alcohol abuse and dependence, hypertension and hypothyroidism, presented to ED for a mechanical fall.   As per ED charts, patient as in the yard and tripped and fell  on his right shoulder and left outstretched hand; (+) HT (-) LOC (-) AC. Patient had left wrist deformity and right upper arm deformity and EMS was called. and he was brought to ED. Patient reports pain in his right shoulder and left wrist.   No fever, no chills, no URT, abdominal or urinary symptoms.     In ED, vitals significant for /110  Laboratory workup non- significant   Alcohol level<10  Trauma work-up consistent with right humeral neck fracture, for which patient had sling applied.  Patient also has a left Colles' fracture that was significantly compacted and displaced.  This was reduced and patient splinted.   ED spoke with daughter and since she works for long hours and can't take care of her father and he can't take care of himself, he was admitted as a social admission.    (17 Dec 2022 04:19)    Patient seen and examined. No new injuries evolved over the past 24 hours.     PHYSICAL EXAM:  General: NAD  HEENT: Normocephalic, atraumatic, EOMI, PEERLA. no scalp lacerations   Neck: Soft, midline trachea. no c-spine tenderness  Chest: No chest wall tenderness  Cardiac: S1, S2  Respiratory: No accessory muscle use  Abdomen: Soft, non-distended, non-tender, no rebound, no guarding.  Ext:  Moving b/l upper and lower extremities.     Vital Signs Last 24 Hrs  T(C): 36.4 (17 Dec 2022 07:53), Max: 36.7 (17 Dec 2022 03:20)  T(F): 97.6 (17 Dec 2022 07:53), Max: 98 (17 Dec 2022 03:20)  HR: 77 (17 Dec 2022 07:53) (74 - 87)  BP: 132/66 (17 Dec 2022 07:53) (120/62 - 165/110)  BP(mean): --  RR: 18 (17 Dec 2022 07:53) (18 - 20)  SpO2: 97% (17 Dec 2022 07:53) (96% - 98%)    Parameters below as of 17 Dec 2022 03:20  Patient On (Oxygen Delivery Method): room air    Labs:  CAPILLARY BLOOD GLUCOSE      POCT Blood Glucose.: 153 mg/dL (16 Dec 2022 20:59)                          15.0   10.40 )-----------( 112      ( 16 Dec 2022 21:14 )             43.4       Auto Neutrophil %: 70.2 % (12-16-22 @ 21:14)  Band Neutrophils %: 2.6 % (12-16-22 @ 21:14)    12-16    134<L>  |  99  |  15  ----------------------------<  151<H>  5.0   |  27  |  1.1      Calcium, Total Serum: 8.7 mg/dL (12-16-22 @ 21:14)      LFTs:             7.9  | 0.8  | 43       ------------------[125     ( 16 Dec 2022 21:14 )  3.6  | x    | 37          Lipase:99     Amylase:x         Lactate, Blood: 1.9 mmol/L (12-16-22 @ 21:14)      Coags:     14.80  ----< 1.29    ( 16 Dec 2022 21:14 )     33.9        Alcohol, Blood: <10 mg/dL (12-16-22 @ 21:14)      Alcohol, Blood: <10 mg/dL (12-16-22 @ 21:14)    RADIOLOGICAL FINDINGS REVIEW:  < from: CT Head No Cont (12.16.22 @ 22:02) >    CT head:  No evidence of acute intracranial abnormality.  CT cervical spine:  No definite evidence of acute fracture of the cervical spine.  Osteopenia. C3-C7 mild vertebralbody height loss, likely chronic.    < from: CT Cervical Spine No Cont (12.16.22 @ 22:03) >    CT head:  No evidence of acute intracranial abnormality.  CT cervical spine:  No definite evidence of acute fracture of the cervical spine.  Osteopenia. C3-C7 mild vertebralbody height loss, likely chronic.    < from: CT Chest w/ IV Cont (12.16.22 @ 22:19) >    1. Acute, impacted right humeral surgical neck comminuted fracture.    2. Osteopenia with age-indeterminate L1-L5, T12, T10, T7-T8, T3 vertebral   body compression fractures; likely chronic.    3. No evidence of solid abdominal organ injury.    4. Liver cirrhosis with portal hypertension. Patent portal vein.      < from: CT Abdomen and Pelvis w/ IV Cont (12.16.22 @ 22:19) >    1. Acute, impacted right humeral surgical neck comminuted fracture.    2. Osteopenia with age-indeterminate L1-L5, T12, T10, T7-T8, T3 vertebral   body compression fractures; likely chronic.    3. No evidence of solid abdominal organ injury.    4. Liver cirrhosis with portal hypertension. Patent portal vein.      < from: Xray Chest 1 View AP/PA (12.16.22 @ 22:37) >    No radiographic evidence of acute cardiopulmonary disease.    < from: Xray Wrist 2 Views, Left (12.16.22 @ 22:37) >    Acute, displaced intra-articular leftdistal radius fracture with dorsal   angulation. Additional ulna styloid process fracture.    < from: Xray Forearm, Left (12.16.22 @ 22:37) >    Acute, displaced left distal radius fracture. Additional ulna styloid   process fracture.    < from: Xray Hand 3 Views, Left (12.16.22 @ 22:38) >    Acute, displaced intra-articular left distal radius fracture with dorsal   angulation. Additional ulna styloid process fracture.    < from: Xray Elbow AP + Lateral + Oblique, Left (12.16.22 @ 22:38) >    No evidence of acute osseous abnormality.    < from: Xray Shoulder 2 Views, Right (12.16.22 @ 22:38) >    Right humeral surgical neck acute fracture.    < from: Xray Humerus, Right (12.16.22 @ 22:38) >    Right humeral surgical neck acute fracture.    < from: Xray Wrist 3 Views, Left (12.17.22 @ 01:20) >    Acute displaced intra-articular distal radius and distal ulnar fracture   again noted with decreased dorsal angulation of the radial fracture.   Overlying cast obscures fine bony detail.          ASSESSMENT/ PLAN:   72yM w/ PMHx of cirrhosis secondary to prior alcohol abuse and dependence, hypertension and hypothyroidism seen as a Trauma Alert s/p mechanical fall w/+HT, -LOC/AC. Trauma assessment in ED: ABCs intact , GCS 15 , AAOx3 , with physical exam findings, imaging, and labs as documented above, injuries are identified: L colles fracture, R humerus surgical neck fracture.       - All images/reports reviewed. No further traumatic work-up warranted.
Patient left wrist was re-reduced by orthopedics team this am. Patient was blocked with 1% lidocaine under usual sterile conditions. Patient was close reduced and placed into sugar tong splint. Post reduction XRs reveal improved alignment.     No acute orthopedic intervention. Possible surgical candidate for left wrist, which can be discussed was an outpatient.  JS VILLAVICENCIO, JS VILLAVICENCIO, sugar tong LUE  pain control   Return to clinic: Orthopaedic office with Dr. Seo, please call [phone] to schedule an appointment   Return to ED with uncontrolled pain/bleeding/fever/chills/numbness/tingling/cool extremity/inability to move extremity

## 2022-12-17 NOTE — H&P ADULT - HISTORY OF PRESENT ILLNESS
72 year old patient, known to have history of cirrhosis secondary to prior alcohol abuse and dependence, hypertension and hypothyroidism, presented to ED for a mechanical fall.   As per ED charts, patient as in the yard and tripped and fell  on his right shoulder and left outstretched hand; (+) HT (-) LOC (-) AC. Patient had left wrist deformity and right upper arm deformity and EMS was called. and he was brought to ED. Patient reports pain in his right shoulder and left wrist.   No fever, no chills, no URT, abdominal or urinary symptoms.     In ED, vitals significant for /110  Laboratory workup non- significant   Alcohol level<10  Trauma work-up consistent with right humeral neck fracture, for which patient had sling applied.  Patient also has a left Colles' fracture that was significantly compacted and displaced.  This was reduced and patient splinted.   ED spoke with daughter and since she works for long hours and can't take care of her father and he can't take care of himself, he was admitted as a social admission.

## 2022-12-17 NOTE — H&P ADULT - ASSESSMENT
72 year old patient, known to have history of cirrhosis secondary to prior alcohol abuse and dependence, hypertension and hypothyroidism, presented to ED for a mechanical fall. Admitted as a social admission.     #Mechanical fall   #right humeral neck fracture s/p sling   #left Colles' fracture s/p reduction and splinting   -  (+) HT (-) LOC (-) AC  - In ED, vitals significant for /110  - Laboratory workup non- significant   - Trauma workup positive for right humeral neck fracture and left Colles' fracture s/p sling and reduction/splinting respectively   - Pain control PRN     #History of cirrhosis secondary to prior alcohol abuse   - Alcohol level<10    #HTN  - c/w     #hypothyroidism  - c/w    #Social admission  -  consulted     Activity: IAT  Diet: DASH/TLC  DVT ppx: Lovenox  GI ppx: none   72 year old patient, known to have history of cirrhosis secondary to prior alcohol abuse and dependence, hypertension and hypothyroidism, presented to ED for a mechanical fall. Admitted as a social admission.     **Patient does not know which pharmacy he deals with; no medications listed in surescripts; said to call daughter in am for confirmation of medications**    #Mechanical fall   #right humeral neck fracture s/p sling   #left Colles' fracture s/p reduction and splinting   -  (+) HT (-) LOC (-) AC  - In ED, vitals significant for /110  - Laboratory workup non- significant   - Trauma workup positive for right humeral neck fracture and left Colles' fracture s/p sling and reduction/splinting respectively   - Pain control PRN     #History of cirrhosis secondary to prior alcohol abuse   - Alcohol level<10    #HTN  - Please reconcile in am     #hypothyroidism  - Please reconcile in am     #Social admission  -  consulted     Activity: IAT  Diet: DASH/TLC  DVT ppx: Lovenox  GI ppx: none

## 2022-12-17 NOTE — H&P ADULT - NSHPPHYSICALEXAM_GEN_ALL_CORE
T(C): 36.7 (12-17-22 @ 03:20), Max: 36.7 (12-17-22 @ 03:20)  HR: 74 (12-17-22 @ 03:20) (74 - 87)  BP: 122/64 (12-17-22 @ 03:20) (120/62 - 165/110)  RR: 18 (12-17-22 @ 03:20) (18 - 20)  SpO2: 98% (12-17-22 @ 03:20) (96% - 98%)    CONSTITUTIONAL: Well groomed, no apparent distress  RESP: No respiratory distress, no use of accessory muscles; CTA b/l, no WRR  CV: RRR, +S1S2; no peripheral edema  GI: Soft, NT, ND, no rebound, no guarding; no palpable masses  NEURO: AAOx3; no focal neurologic deficits

## 2022-12-17 NOTE — ED PROCEDURE NOTE - CPROC ED TIME OUT STATEMENT1
“Patient's name, , procedure and correct site were confirmed during the [place] Timeout.”
“Patient's name, , procedure and correct site were confirmed during the [place] Timeout.”
“Patient's name, , procedure and correct site were confirmed during the [place] Timeout.”
“Patient's name, , procedure and correct site were confirmed during the [place] Timeout.”

## 2022-12-17 NOTE — H&P ADULT - NSHPLABSRESULTS_GEN_ALL_CORE
15.0   10.40 )-----------( 112      ( 16 Dec 2022 21:14 )             43.4     12-16    134<L>  |  99  |  15  ----------------------------<  151<H>  5.0   |  27  |  1.1    Ca    8.7      16 Dec 2022 21:14    TPro  7.9  /  Alb  3.6  /  TBili  0.8  /  DBili  x   /  AST  43<H>  /  ALT  37  /  AlkPhos  125<H>  12-16      < from: CT Chest w/ IV Cont (12.16.22 @ 22:19) >    1. Acute, impacted right humeral surgical neck comminuted fracture.    2. Osteopenia with age-indeterminate L1-L5, T12, T10, T7-T8, T3 vertebral   body compression fractures; likely chronic.    3. No evidence of solid abdominal organ injury.    4. Liver cirrhosis with portal hypertension. Patent portal vein.    < end of copied text >

## 2022-12-17 NOTE — H&P ADULT - ATTENDING COMMENTS
72 year old male with PMH of HTN, Alcoholic liver cirrhosis and hypothyroidism, presented to ED for a mechanical fall. Patient was in the yard and tripped and fell  on his right shoulder and left outstretched hand; (+) HT (-) LOC (-) AC. Patient had left wrist deformity and right upper arm deformity and EMS was called. and he was brought to ED. Patient reports pain in his right shoulder and left wrist. In ED, vitals significant for /110, Trauma work-up consistent with right humeral neck fracture, for which patient had sling applied.  Patient also has a left wrist Colles' fracture that was significantly compacted and displaced.  This was reduced and patient splinted.     PHYSICAL EXAM:  GENERAL: NAD, well-developed.  HEAD:  Atraumatic, Normocephalic.  EYES: EOMI, PERRLA, conjunctiva and sclera clear.  NECK: Supple, No JVD.  CHEST/LUNG: Clear to auscultation bilaterally; No wheeze.  HEART: Regular rate and rhythm; S1 S2.   ABDOMEN: Soft, Nontender, Nondistended; Bowel sounds present.  EXTREMITIES:  RUE in sling, distal LUE with splint  PSYCH: AAOx3.  NEUROLOGY: non-focal.  SKIN: No rashes or lesions.    A/P:   Mechanical Fall:   Right Humerus Fracture s/p sling  Left distal Radius fracture: s/p reduction in the ED and splint.   R-reduction was done today by orthopedic  Outpatient follow up with orthopedic.     Compensated Alcoholic liver Cirrhosis:   History of Hepatic Encephalopathy.   No ascites, mild thrombocytopenia.   Resume Home Aldactone, Lasix and Lactulose.   Continue Coreg.     Hypothyroidism: Continue Synthroid.    Pending STR, PT

## 2022-12-17 NOTE — ED PROCEDURE NOTE - CPROC ED POST PROC CARE GUIDE1
Verbal/written post procedure instructions were given to patient/caregiver./Instructed patient/caregiver to follow-up with primary care physician./Instructed patient/caregiver regarding signs and symptoms of infection./Elevate the injured extremity as instructed./Keep the cast/splint/dressing clean and dry.
Verbal/written post procedure instructions were given to patient/caregiver./Instructed patient/caregiver to follow-up with primary care physician./Instructed patient/caregiver regarding signs and symptoms of infection./Elevate the injured extremity as instructed./Keep the cast/splint/dressing clean and dry.
Verbal/written post procedure instructions were given to patient/caregiver./Instructed patient/caregiver to follow-up with primary care physician./Instructed patient/caregiver regarding signs and symptoms of infection./Keep the cast/splint/dressing clean and dry.
Verbal/written post procedure instructions were given to patient/caregiver./Instructed patient/caregiver to follow-up with primary care physician./Elevate the injured extremity as instructed./Keep the cast/splint/dressing clean and dry.

## 2022-12-18 LAB
ALBUMIN SERPL ELPH-MCNC: 3 G/DL — LOW (ref 3.5–5.2)
ALP SERPL-CCNC: 106 U/L — SIGNIFICANT CHANGE UP (ref 30–115)
ALT FLD-CCNC: 28 U/L — SIGNIFICANT CHANGE UP (ref 0–41)
ANION GAP SERPL CALC-SCNC: 12 MMOL/L — SIGNIFICANT CHANGE UP (ref 7–14)
AST SERPL-CCNC: 30 U/L — SIGNIFICANT CHANGE UP (ref 0–41)
BASOPHILS # BLD AUTO: 0.05 K/UL — SIGNIFICANT CHANGE UP (ref 0–0.2)
BASOPHILS NFR BLD AUTO: 0.5 % — SIGNIFICANT CHANGE UP (ref 0–1)
BILIRUB SERPL-MCNC: 1.2 MG/DL — SIGNIFICANT CHANGE UP (ref 0.2–1.2)
BUN SERPL-MCNC: 12 MG/DL — SIGNIFICANT CHANGE UP (ref 10–20)
CALCIUM SERPL-MCNC: 8.4 MG/DL — SIGNIFICANT CHANGE UP (ref 8.4–10.5)
CHLORIDE SERPL-SCNC: 98 MMOL/L — SIGNIFICANT CHANGE UP (ref 98–110)
CO2 SERPL-SCNC: 27 MMOL/L — SIGNIFICANT CHANGE UP (ref 17–32)
CREAT SERPL-MCNC: 0.7 MG/DL — SIGNIFICANT CHANGE UP (ref 0.7–1.5)
EGFR: 99 ML/MIN/1.73M2 — SIGNIFICANT CHANGE UP
EOSINOPHIL # BLD AUTO: 0.1 K/UL — SIGNIFICANT CHANGE UP (ref 0–0.7)
EOSINOPHIL NFR BLD AUTO: 1 % — SIGNIFICANT CHANGE UP (ref 0–8)
GLUCOSE SERPL-MCNC: 192 MG/DL — HIGH (ref 70–99)
HCT VFR BLD CALC: 39.9 % — LOW (ref 42–52)
HGB BLD-MCNC: 13.6 G/DL — LOW (ref 14–18)
IMM GRANULOCYTES NFR BLD AUTO: 0.6 % — HIGH (ref 0.1–0.3)
LYMPHOCYTES # BLD AUTO: 1.05 K/UL — LOW (ref 1.2–3.4)
LYMPHOCYTES # BLD AUTO: 10.7 % — LOW (ref 20.5–51.1)
MAGNESIUM SERPL-MCNC: 1.9 MG/DL — SIGNIFICANT CHANGE UP (ref 1.8–2.4)
MCHC RBC-ENTMCNC: 34.1 G/DL — SIGNIFICANT CHANGE UP (ref 32–37)
MCHC RBC-ENTMCNC: 34.8 PG — HIGH (ref 27–31)
MCV RBC AUTO: 102 FL — HIGH (ref 80–94)
MONOCYTES # BLD AUTO: 1.25 K/UL — HIGH (ref 0.1–0.6)
MONOCYTES NFR BLD AUTO: 12.8 % — HIGH (ref 1.7–9.3)
NEUTROPHILS # BLD AUTO: 7.28 K/UL — HIGH (ref 1.4–6.5)
NEUTROPHILS NFR BLD AUTO: 74.4 % — SIGNIFICANT CHANGE UP (ref 42.2–75.2)
NRBC # BLD: 0 /100 WBCS — SIGNIFICANT CHANGE UP (ref 0–0)
PLATELET # BLD AUTO: 89 K/UL — LOW (ref 130–400)
POTASSIUM SERPL-MCNC: 4.4 MMOL/L — SIGNIFICANT CHANGE UP (ref 3.5–5)
POTASSIUM SERPL-SCNC: 4.4 MMOL/L — SIGNIFICANT CHANGE UP (ref 3.5–5)
PROT SERPL-MCNC: 6.8 G/DL — SIGNIFICANT CHANGE UP (ref 6–8)
RBC # BLD: 3.91 M/UL — LOW (ref 4.7–6.1)
RBC # FLD: 13 % — SIGNIFICANT CHANGE UP (ref 11.5–14.5)
SODIUM SERPL-SCNC: 137 MMOL/L — SIGNIFICANT CHANGE UP (ref 135–146)
WBC # BLD: 9.79 K/UL — SIGNIFICANT CHANGE UP (ref 4.8–10.8)
WBC # FLD AUTO: 9.79 K/UL — SIGNIFICANT CHANGE UP (ref 4.8–10.8)

## 2022-12-18 PROCEDURE — 99233 SBSQ HOSP IP/OBS HIGH 50: CPT

## 2022-12-18 RX ORDER — TRAMADOL HYDROCHLORIDE 50 MG/1
25 TABLET ORAL ONCE
Refills: 0 | Status: DISCONTINUED | OUTPATIENT
Start: 2022-12-18 | End: 2022-12-18

## 2022-12-18 RX ORDER — ACETAMINOPHEN 500 MG
650 TABLET ORAL EVERY 6 HOURS
Refills: 0 | Status: DISCONTINUED | OUTPATIENT
Start: 2022-12-18 | End: 2022-12-21

## 2022-12-18 RX ORDER — KETOROLAC TROMETHAMINE 30 MG/ML
10 SYRINGE (ML) INJECTION ONCE
Refills: 0 | Status: DISCONTINUED | OUTPATIENT
Start: 2022-12-18 | End: 2022-12-20

## 2022-12-18 RX ADMIN — Medication 125 MICROGRAM(S): at 05:06

## 2022-12-18 RX ADMIN — SPIRONOLACTONE 50 MILLIGRAM(S): 25 TABLET, FILM COATED ORAL at 05:06

## 2022-12-18 RX ADMIN — Medication 20 MILLIGRAM(S): at 05:06

## 2022-12-18 RX ADMIN — ENOXAPARIN SODIUM 40 MILLIGRAM(S): 100 INJECTION SUBCUTANEOUS at 11:28

## 2022-12-18 RX ADMIN — LACTULOSE 30 GRAM(S): 10 SOLUTION ORAL at 05:05

## 2022-12-18 RX ADMIN — LACTULOSE 30 GRAM(S): 10 SOLUTION ORAL at 13:34

## 2022-12-18 RX ADMIN — CARVEDILOL PHOSPHATE 3.12 MILLIGRAM(S): 80 CAPSULE, EXTENDED RELEASE ORAL at 17:28

## 2022-12-18 RX ADMIN — TRAMADOL HYDROCHLORIDE 25 MILLIGRAM(S): 50 TABLET ORAL at 05:04

## 2022-12-18 RX ADMIN — CARVEDILOL PHOSPHATE 3.12 MILLIGRAM(S): 80 CAPSULE, EXTENDED RELEASE ORAL at 05:06

## 2022-12-18 RX ADMIN — LACTULOSE 30 GRAM(S): 10 SOLUTION ORAL at 22:50

## 2022-12-18 RX ADMIN — PANTOPRAZOLE SODIUM 40 MILLIGRAM(S): 20 TABLET, DELAYED RELEASE ORAL at 05:06

## 2022-12-18 NOTE — PROGRESS NOTE ADULT - ASSESSMENT
72 year old male with PMH of HTN, Alcoholic liver cirrhosis and hypothyroidism, presented to ED for a mechanical fall. Found to have right humerus fracture and left wrist Colles fracture s/p reduction x2.      72 year old male with PMH of HTN, Alcoholic liver cirrhosis and hypothyroidism, presented to ED for a mechanical fall. Found to have right humerus fracture and left wrist Colles fracture s/p reduction x2.     #Mechanical Fall:   #Right Humerus Fracture s/p sling  #Left distal Radius fracture: s/p reduction in the ED and splint.   -s/p R-reduction by orthopedic x2  -Outpatient follow up with orthopedic.     #Compensated Alcoholic liver Cirrhosis:   #History of Hepatic Encephalopathy.   -No ascites, mild thrombocytopenia.   -Resume Home Aldactone, Lasix and Lactulose.   -Continue Coreg.   -check b12/folate/tsh    #Hypothyroidism: Continue Synthroid.    #DVT PPx: LVX 40mg sq qhs    #Progress Note Handoff  Pending (specify): PT/OT/Rehab, placement  Family discussion: Plan of care discussed with patient, aware and agreeable   Disposition: Likely SNF

## 2022-12-18 NOTE — PATIENT PROFILE ADULT - FALL HARM RISK - HARM RISK INTERVENTIONS

## 2022-12-18 NOTE — PATIENT PROFILE ADULT - NSPRONUTRITIONRISK_GEN_A_NUR
Chief Complaints and History of Present Illnesses   Patient presents with     Diabetic Retinopathy Follow Up     Chief Complaint(s) and History of Present Illness(es)     Diabetic Retinopathy Follow Up     Laterality: both eyes    Onset: gradual    Onset: months ago    Quality: Feels that the va is worse at night      Frequency: constantly    Associated symptoms: flashes (more at night)    Pain scale: 0/10              Comments     Took longer to recover after the last injection  BS 61  Lab Results       Component                Value               Date            A1C                      6.2                 02/2019                  A1C                      6.6                 08/08/2018                 A1C                      6.5                 06/09/2017                 A1C                      7.8                 10/25/2016             Shawanda Noriega COT 12:00 PM July 25, 2019                   
No indicators present

## 2022-12-19 LAB
ALBUMIN SERPL ELPH-MCNC: 3.1 G/DL — LOW (ref 3.5–5.2)
ALP SERPL-CCNC: 111 U/L — SIGNIFICANT CHANGE UP (ref 30–115)
ALT FLD-CCNC: 29 U/L — SIGNIFICANT CHANGE UP (ref 0–41)
ANION GAP SERPL CALC-SCNC: 9 MMOL/L — SIGNIFICANT CHANGE UP (ref 7–14)
AST SERPL-CCNC: 29 U/L — SIGNIFICANT CHANGE UP (ref 0–41)
BASOPHILS # BLD AUTO: 0.06 K/UL — SIGNIFICANT CHANGE UP (ref 0–0.2)
BASOPHILS NFR BLD AUTO: 0.6 % — SIGNIFICANT CHANGE UP (ref 0–1)
BILIRUB SERPL-MCNC: 1 MG/DL — SIGNIFICANT CHANGE UP (ref 0.2–1.2)
BUN SERPL-MCNC: 13 MG/DL — SIGNIFICANT CHANGE UP (ref 10–20)
CALCIUM SERPL-MCNC: 8.7 MG/DL — SIGNIFICANT CHANGE UP (ref 8.4–10.5)
CHLORIDE SERPL-SCNC: 99 MMOL/L — SIGNIFICANT CHANGE UP (ref 98–110)
CO2 SERPL-SCNC: 29 MMOL/L — SIGNIFICANT CHANGE UP (ref 17–32)
CREAT SERPL-MCNC: 0.8 MG/DL — SIGNIFICANT CHANGE UP (ref 0.7–1.5)
EGFR: 95 ML/MIN/1.73M2 — SIGNIFICANT CHANGE UP
EOSINOPHIL # BLD AUTO: 0.21 K/UL — SIGNIFICANT CHANGE UP (ref 0–0.7)
EOSINOPHIL NFR BLD AUTO: 2 % — SIGNIFICANT CHANGE UP (ref 0–8)
GLUCOSE SERPL-MCNC: 140 MG/DL — HIGH (ref 70–99)
HCT VFR BLD CALC: 38.9 % — LOW (ref 42–52)
HGB BLD-MCNC: 13.9 G/DL — LOW (ref 14–18)
IMM GRANULOCYTES NFR BLD AUTO: 0.8 % — HIGH (ref 0.1–0.3)
INR BLD: 1.3 RATIO — SIGNIFICANT CHANGE UP (ref 0.65–1.3)
LYMPHOCYTES # BLD AUTO: 0.98 K/UL — LOW (ref 1.2–3.4)
LYMPHOCYTES # BLD AUTO: 9.5 % — LOW (ref 20.5–51.1)
MAGNESIUM SERPL-MCNC: 1.9 MG/DL — SIGNIFICANT CHANGE UP (ref 1.8–2.4)
MCHC RBC-ENTMCNC: 35.5 PG — HIGH (ref 27–31)
MCHC RBC-ENTMCNC: 35.7 G/DL — SIGNIFICANT CHANGE UP (ref 32–37)
MCV RBC AUTO: 99.2 FL — HIGH (ref 80–94)
MELD SCORE WITH DIALYSIS: 23 POINTS — SIGNIFICANT CHANGE UP
MELD SCORE WITHOUT DIALYSIS: 9 POINTS — SIGNIFICANT CHANGE UP
MONOCYTES # BLD AUTO: 1.39 K/UL — HIGH (ref 0.1–0.6)
MONOCYTES NFR BLD AUTO: 13.5 % — HIGH (ref 1.7–9.3)
NEUTROPHILS # BLD AUTO: 7.59 K/UL — HIGH (ref 1.4–6.5)
NEUTROPHILS NFR BLD AUTO: 73.6 % — SIGNIFICANT CHANGE UP (ref 42.2–75.2)
NRBC # BLD: 0 /100 WBCS — SIGNIFICANT CHANGE UP (ref 0–0)
PLATELET # BLD AUTO: 105 K/UL — LOW (ref 130–400)
POTASSIUM SERPL-MCNC: 3.9 MMOL/L — SIGNIFICANT CHANGE UP (ref 3.5–5)
POTASSIUM SERPL-SCNC: 3.9 MMOL/L — SIGNIFICANT CHANGE UP (ref 3.5–5)
PROT SERPL-MCNC: 7.1 G/DL — SIGNIFICANT CHANGE UP (ref 6–8)
PROTHROM AB SERPL-ACNC: 14.9 SEC — HIGH (ref 9.95–12.87)
RBC # BLD: 3.92 M/UL — LOW (ref 4.7–6.1)
RBC # FLD: 13.2 % — SIGNIFICANT CHANGE UP (ref 11.5–14.5)
SODIUM SERPL-SCNC: 137 MMOL/L — SIGNIFICANT CHANGE UP (ref 135–146)
TROPONIN T SERPL-MCNC: <0.01 NG/ML — SIGNIFICANT CHANGE UP
TSH SERPL-MCNC: 0.41 UIU/ML — SIGNIFICANT CHANGE UP (ref 0.27–4.2)
WBC # BLD: 10.31 K/UL — SIGNIFICANT CHANGE UP (ref 4.8–10.8)
WBC # FLD AUTO: 10.31 K/UL — SIGNIFICANT CHANGE UP (ref 4.8–10.8)

## 2022-12-19 PROCEDURE — 99232 SBSQ HOSP IP/OBS MODERATE 35: CPT

## 2022-12-19 PROCEDURE — 93010 ELECTROCARDIOGRAM REPORT: CPT

## 2022-12-19 RX ORDER — KETOROLAC TROMETHAMINE 30 MG/ML
15 SYRINGE (ML) INJECTION ONCE
Refills: 0 | Status: DISCONTINUED | OUTPATIENT
Start: 2022-12-19 | End: 2022-12-19

## 2022-12-19 RX ORDER — GUAIFENESIN/DEXTROMETHORPHAN 600MG-30MG
10 TABLET, EXTENDED RELEASE 12 HR ORAL ONCE
Refills: 0 | Status: DISCONTINUED | OUTPATIENT
Start: 2022-12-19 | End: 2022-12-21

## 2022-12-19 RX ORDER — LIDOCAINE 4 G/100G
1 CREAM TOPICAL ONCE
Refills: 0 | Status: COMPLETED | OUTPATIENT
Start: 2022-12-19 | End: 2022-12-19

## 2022-12-19 RX ORDER — LIDOCAINE 4 G/100G
1 CREAM TOPICAL DAILY
Refills: 0 | Status: DISCONTINUED | OUTPATIENT
Start: 2022-12-19 | End: 2022-12-21

## 2022-12-19 RX ADMIN — LIDOCAINE 1 PATCH: 4 CREAM TOPICAL at 19:59

## 2022-12-19 RX ADMIN — LIDOCAINE 1 PATCH: 4 CREAM TOPICAL at 07:30

## 2022-12-19 RX ADMIN — LACTULOSE 30 GRAM(S): 10 SOLUTION ORAL at 04:55

## 2022-12-19 RX ADMIN — Medication 650 MILLIGRAM(S): at 03:40

## 2022-12-19 RX ADMIN — Medication 15 MILLIGRAM(S): at 05:43

## 2022-12-19 RX ADMIN — SPIRONOLACTONE 50 MILLIGRAM(S): 25 TABLET, FILM COATED ORAL at 04:56

## 2022-12-19 RX ADMIN — ENOXAPARIN SODIUM 40 MILLIGRAM(S): 100 INJECTION SUBCUTANEOUS at 17:57

## 2022-12-19 RX ADMIN — Medication 125 MICROGRAM(S): at 04:55

## 2022-12-19 RX ADMIN — LIDOCAINE 1 PATCH: 4 CREAM TOPICAL at 05:43

## 2022-12-19 RX ADMIN — PANTOPRAZOLE SODIUM 40 MILLIGRAM(S): 20 TABLET, DELAYED RELEASE ORAL at 04:56

## 2022-12-19 RX ADMIN — Medication 20 MILLIGRAM(S): at 04:55

## 2022-12-19 RX ADMIN — CARVEDILOL PHOSPHATE 3.12 MILLIGRAM(S): 80 CAPSULE, EXTENDED RELEASE ORAL at 04:55

## 2022-12-19 RX ADMIN — CARVEDILOL PHOSPHATE 3.12 MILLIGRAM(S): 80 CAPSULE, EXTENDED RELEASE ORAL at 17:57

## 2022-12-19 RX ADMIN — LIDOCAINE 1 PATCH: 4 CREAM TOPICAL at 17:57

## 2022-12-19 NOTE — PHYSICAL THERAPY INITIAL EVALUATION ADULT - SPECIFY REASON(S)
Messaged Dr. Gonzales on Teams this am. Still awaiting activity orders. Will cont to f/u as appropriate.

## 2022-12-19 NOTE — PHYSICAL THERAPY INITIAL EVALUATION ADULT - PERTINENT HX OF CURRENT PROBLEM, REHAB EVAL
72 year old patient, known to have history of cirrhosis secondary to prior alcohol abuse and dependence, hypertension and hypothyroidism, presented to ED for a mechanical fall.   As per ED charts, patient as in the yard and tripped and fell  on his right shoulder and left outstretched hand; (+) HT (-) LOC (-) AC. Patient had left wrist deformity and right upper arm deformity and EMS was called. and he was brought to ED. Patient reports pain in his right shoulder and left wrist.   No fever, no chills, no URT, abdominal or urinary symptoms.

## 2022-12-19 NOTE — OCCUPATIONAL THERAPY INITIAL EVALUATION ADULT - PLANNED THERAPY INTERVENTIONS, OT EVAL
ADL retraining/bed mobility training/orthotic fitting/training/parent/caregiver training.../ROM/strengthening/transfer training

## 2022-12-19 NOTE — PROGRESS NOTE ADULT - ASSESSMENT
72 year old male with PMH of HTN, Alcoholic liver cirrhosis and hypothyroidism, presented to ED for a mechanical fall. Found to have right humerus fracture and left wrist Colles fracture s/p reduction x2.     #Mechanical Fall    #Right Humerus Fracture s/p sling  #Left distal Radius fracture: s/p reduction in the ED and splint.   -s/p R-reduction by orthopedic x2  -Outpatient follow up with orthopedic.     #Compensated Alcoholic liver Cirrhosis:   #History of Hepatic Encephalopathy.   -No ascites, mild thrombocytopenia.   -Resume Home Aldactone, Lasix and Lactulose.   -Continue Coreg.   -check b12/folate; TSH wnl     #Hypothyroidism: Continue Synthroid.    #DVT PPx: LVX 40mg sq qhs    #Progress Note Handoff  Pending (specify): Placement  Family discussion: Plan of care discussed with patient, aware and agreeable   Disposition: Likely SNF; anticipate

## 2022-12-19 NOTE — CONSULT NOTE ADULT - SUBJECTIVE AND OBJECTIVE BOX
HPI:  72 year old patient, known to have history of cirrhosis secondary to alcohol use disorder, hypertension and hypothyroidism, presented to ED for a mechanical fall.   As per ED charts, patient as in the yard and tripped and fell  on his right shoulder and left outstretched hand; (+) HT (-) LOC (-) AC. Patient had left wrist deformity and right upper arm deformity and EMS was called. and he was brought to ED. Patient reports pain in his right shoulder and left wrist.   No fever, no chills, no URT, abdominal or urinary symptoms. He has left knee OA.     In ED, vitals significant for /110  Laboratory workup non- significant   Alcohol level<10  Trauma work-up consistent with right humeral neck fracture, for which patient had sling applied.  Patient also has a left Colles' fracture that was significantly compacted and displaced.  This was reduced and patient splinted.   ED spoke with daughter and since she works for long hours and can't take care of her father and he can't take care of himself, he was admitted as a social admission.    (17 Dec 2022 04:19)      PAST MEDICAL & SURGICAL HISTORY:  Cirrhosis      Hypothyroidism      GERD (gastroesophageal reflux disease)          Hospital Course:  He amb 3 ft with PT without an assistive dev on account of bilat UE fractures with min assist.   TODAY'S SUBJECTIVE & REVIEW OF SYMPTOMS:     Constitutional WNL   Cardio WNL   Resp WNL   GI WNL  Heme WNL  Endo WNL  Skin WNL  MSK WNL  Neuro WNL  Cognitive WNL  Psych WNL      MEDICATIONS  (STANDING):  carvedilol 3.125 milliGRAM(s) Oral every 12 hours  enoxaparin Injectable 40 milliGRAM(s) SubCutaneous every 24 hours  furosemide    Tablet 20 milliGRAM(s) Oral daily  ketorolac   Injectable 10 milliGRAM(s) IV Push once  lactulose Syrup 30 Gram(s) Oral three times a day  levothyroxine 125 MICROGram(s) Oral daily  lidocaine   4% Patch 1 Patch Transdermal daily  pantoprazole    Tablet 40 milliGRAM(s) Oral before breakfast  rifAXIMin 550 milliGRAM(s) Oral two times a day  spironolactone 50 milliGRAM(s) Oral daily    MEDICATIONS  (PRN):  acetaminophen     Tablet .. 650 milliGRAM(s) Oral every 6 hours PRN Mild Pain (1 - 3), Moderate Pain (4 - 6)      FAMILY HISTORY:      Allergies    No Known Allergies    Intolerances        SOCIAL HISTORY:    [x  ] history Etoh  [  ] Smoking  [  ] Substance abuse     Home Environment:  [x  ] Home Alone  Wife at a SNF after suffering a stroke 4-5 months ago.   [  ] Lives with Family  [  ] Home Health Aid    Dwelling:  [ x ] basement Apartment  [  ] Private House  [  ] Adult Home  [  ] Skilled Nursing Facility      [  ] Short Term  [  ] Long Term  [  ] Stairs       Elevator [  ]    FUNCTIONAL STATUS PTA: (Check all that apply)  Ambulation: [   ]Independent    [  ] Dependent     [  ] Non-Ambulatory  Assistive Device: [  ] SA Cane  [  ]  Q Cane  [  ] Walker  [  ]  Wheelchair  ADL : [  ] Independent  [  ]  Dependent       Vital Signs Last 24 Hrs  T(C): 36.4 (19 Dec 2022 13:07), Max: 36.9 (19 Dec 2022 01:00)  T(F): 97.5 (19 Dec 2022 13:07), Max: 98.4 (19 Dec 2022 01:00)  HR: 87 (19 Dec 2022 13:07) (83 - 87)  BP: 116/55 (19 Dec 2022 10:10) (116/55 - 153/65)  BP(mean): --  RR: 18 (19 Dec 2022 13:07) (18 - 18)  SpO2: 95% (19 Dec 2022 13:07) (95% - 96%)          PHYSICAL EXAM: Alert & Oriented X3  GENERAL: NAD, well-groomed, well-developed  HEAD:  Atraumatic, Normocephalic  EYES: EOMI, PERRLA, conjunctiva and sclera clear  NECK: Supple, No JVD, Normal thyroid  CHEST/LUNG: Clear to percussion bilaterally; No rales, rhonchi, wheezing, or rubs  HEART: Regular rate and rhythm; No murmurs, rubs, or gallops  ABDOMEN: Soft, Nontender, Nondistended; Bowel sounds present  EXTREMITIES:  2+ Peripheral Pulses, No clubbing, cyanosis, or edema    NERVOUS SYSTEM:  Cranial Nerves 2-12 intact [  ] Abnormal  [  ]  ROM: WFL all extremities [  ]  Abnormal [x  ] crepitus left knee; sling right shoulder, left short arm splint  Motor Strength: WFL all extremities  [  ]  Abnormal [  x] 5-/5 LE's  Sensation: intact to light touch [x  ] Abnormal [  ]  Reflexes: Symmetric [  ]  Abnormal [  ]    FUNCTIONAL STATUS:  Bed Mobility: Independent [  ]  Supervision [  ]  Needs Assistance [  ]  N/A [  ]  Transfers: Independent [  ]  Supervision [  ]  Needs Assistance [  ]  N/A [  ]   Ambulation: Independent [  ]  Supervision [  ]  Needs Assistance [  ]  N/A [  ]  ADL: Independent [  ] Requires Assistance [  ] N/A [  ]      LABS:                        13.9   10.31 )-----------( 105      ( 19 Dec 2022 06:34 )             38.9     12-19    137  |  99  |  13  ----------------------------<  140<H>  3.9   |  29  |  0.8    Ca    8.7      19 Dec 2022 06:34  Mg     1.9     12-19    TPro  7.1  /  Alb  3.1<L>  /  TBili  1.0  /  DBili  x   /  AST  29  /  ALT  29  /  AlkPhos  111  12-19    PT/INR - ( 19 Dec 2022 06:34 )   PT: 14.90 sec;   INR: 1.30 ratio               RADIOLOGY & ADDITIONAL STUDIES:    Assesment:

## 2022-12-19 NOTE — OCCUPATIONAL THERAPY INITIAL EVALUATION ADULT - ADDITIONAL COMMENTS
Pt lives in basement apartment with spouse (spouse presently in [facility] for [facility]). Daughter resides in main portion of home. + steps upstairs to visit with daughter. + stall shower, - driving

## 2022-12-19 NOTE — PROVIDER CONTACT NOTE (OTHER) - SITUATION
pt states he is having chest pain right over the area of his heart. he states he has had this since yesterday and that it is also the area where he fell.
patient refusing 2200 dose of Lactulose.

## 2022-12-19 NOTE — OCCUPATIONAL THERAPY INITIAL EVALUATION ADULT - TRANSFER TRAINING, PT EVAL
Pt will perform sit<>stand and bed<>chair transfers with CG A by discharge using most appropriate AD

## 2022-12-19 NOTE — OCCUPATIONAL THERAPY INITIAL EVALUATION ADULT - GENERAL OBSERVATIONS, REHAB EVAL
Pt encountered seated in bedside chair, + Sling to RUE, + sugar tong splint to LUE, + IV locked, + chair alarm. Pt agreeable to bedside OT assessment, may be seen as confirmed with RN. Pt returned to bedside chair, + IV locked, + RUE sling, + LUE sugar tong splint. + chair alarm. RN aware

## 2022-12-19 NOTE — CONSULT NOTE ADULT - ASSESSMENT
IMPRESSION: Rehab of multitrauma, gait abnormality, left knee OA, right prox humerus FX, left distal radius FX     PRECAUTIONS: [x  ] Cardiac  [  ] Respiratory  [  ] Seizures [  ] Contact Isolation  [  ] Droplet Isolation  [  ] Other    Weight Bearing Status: NWB UEs    RECOMMENDATION:    Out of Bed to Chair     DVT/Decubiti Prophylaxis    REHAB PLAN:     [  x ] Bedside P/T 3-5 times a week   [   ]   Bedside O/T  2-3 times a week             [   ] No Rehab Therapy Indicated                   [   ]  Speech Therapy   Conditioning/ROM                                    ADL  Bed Mobility                                               Conditioning/ROM  Transfers                                                     Bed Mobility  Sitting /Standing Balance                         Transfers                                        Gait Training                                               Sitting/Standing Balance  Stair Training [   ]Applicable                    Home equipment Eval                                                                        Splinting  [   ] Only      GOALS:   ADL   [ x  ]   Independent                    Transfers  [  x ] Independent                          Ambulation  [ x  ] Independent     [  x  ] With device                            [   ]  CG                                                         [   ]  CG                                                                  [   ] CG                            [    ] Min A                                                   [   ] Min A                                                              [   ] Min  A          DISCHARGE PLAN:   [   ]  Good candidate for Intensive Rehabilitation/Hospital based-4A [facility]                                             Will tolerate 3hrs Intensive Rehab Daily                                       [ x   ]  Short Term Rehab in Skilled Nursing Facility; he won't tolerate the intensity of acute rehab.                                       [    ]  Home with Outpatient or  services                                         [    ]  Possible Candidate for Intensive Hospital based Rehab

## 2022-12-19 NOTE — PHYSICAL THERAPY INITIAL EVALUATION ADULT - GENERAL OBSERVATIONS, REHAB EVAL
10:10-10:35; Pt encountered in bed, on bed pan, agreeable to PT. PCA Thelma assisted with hygiene care. + RUE sling, L wrist splint/ace wrap.

## 2022-12-20 LAB
ALBUMIN SERPL ELPH-MCNC: 2.6 G/DL — LOW (ref 3.5–5.2)
ALP SERPL-CCNC: 103 U/L — SIGNIFICANT CHANGE UP (ref 30–115)
ALT FLD-CCNC: 23 U/L — SIGNIFICANT CHANGE UP (ref 0–41)
ANION GAP SERPL CALC-SCNC: 10 MMOL/L — SIGNIFICANT CHANGE UP (ref 7–14)
AST SERPL-CCNC: 24 U/L — SIGNIFICANT CHANGE UP (ref 0–41)
BASOPHILS # BLD AUTO: 0.04 K/UL — SIGNIFICANT CHANGE UP (ref 0–0.2)
BASOPHILS NFR BLD AUTO: 0.4 % — SIGNIFICANT CHANGE UP (ref 0–1)
BILIRUB SERPL-MCNC: 1 MG/DL — SIGNIFICANT CHANGE UP (ref 0.2–1.2)
BUN SERPL-MCNC: 13 MG/DL — SIGNIFICANT CHANGE UP (ref 10–20)
CALCIUM SERPL-MCNC: 8.3 MG/DL — LOW (ref 8.4–10.5)
CHLORIDE SERPL-SCNC: 100 MMOL/L — SIGNIFICANT CHANGE UP (ref 98–110)
CO2 SERPL-SCNC: 26 MMOL/L — SIGNIFICANT CHANGE UP (ref 17–32)
CREAT SERPL-MCNC: 0.6 MG/DL — LOW (ref 0.7–1.5)
EGFR: 103 ML/MIN/1.73M2 — SIGNIFICANT CHANGE UP
EOSINOPHIL # BLD AUTO: 0.31 K/UL — SIGNIFICANT CHANGE UP (ref 0–0.7)
EOSINOPHIL NFR BLD AUTO: 2.9 % — SIGNIFICANT CHANGE UP (ref 0–8)
FOLATE SERPL-MCNC: 4.5 NG/ML — LOW
GLUCOSE SERPL-MCNC: 105 MG/DL — HIGH (ref 70–99)
HCT VFR BLD CALC: 35.9 % — LOW (ref 42–52)
HGB BLD-MCNC: 12.9 G/DL — LOW (ref 14–18)
IMM GRANULOCYTES NFR BLD AUTO: 0.7 % — HIGH (ref 0.1–0.3)
LYMPHOCYTES # BLD AUTO: 1.01 K/UL — LOW (ref 1.2–3.4)
LYMPHOCYTES # BLD AUTO: 9.5 % — LOW (ref 20.5–51.1)
MAGNESIUM SERPL-MCNC: 1.7 MG/DL — LOW (ref 1.8–2.4)
MCHC RBC-ENTMCNC: 35.7 PG — HIGH (ref 27–31)
MCHC RBC-ENTMCNC: 35.9 G/DL — SIGNIFICANT CHANGE UP (ref 32–37)
MCV RBC AUTO: 99.4 FL — HIGH (ref 80–94)
MONOCYTES # BLD AUTO: 1.3 K/UL — HIGH (ref 0.1–0.6)
MONOCYTES NFR BLD AUTO: 12.2 % — HIGH (ref 1.7–9.3)
NEUTROPHILS # BLD AUTO: 7.95 K/UL — HIGH (ref 1.4–6.5)
NEUTROPHILS NFR BLD AUTO: 74.3 % — SIGNIFICANT CHANGE UP (ref 42.2–75.2)
NRBC # BLD: 0 /100 WBCS — SIGNIFICANT CHANGE UP (ref 0–0)
PLATELET # BLD AUTO: 109 K/UL — LOW (ref 130–400)
POTASSIUM SERPL-MCNC: 4 MMOL/L — SIGNIFICANT CHANGE UP (ref 3.5–5)
POTASSIUM SERPL-SCNC: 4 MMOL/L — SIGNIFICANT CHANGE UP (ref 3.5–5)
PROT SERPL-MCNC: 6.5 G/DL — SIGNIFICANT CHANGE UP (ref 6–8)
RBC # BLD: 3.61 M/UL — LOW (ref 4.7–6.1)
RBC # FLD: 13.3 % — SIGNIFICANT CHANGE UP (ref 11.5–14.5)
SARS-COV-2 RNA SPEC QL NAA+PROBE: SIGNIFICANT CHANGE UP
SODIUM SERPL-SCNC: 136 MMOL/L — SIGNIFICANT CHANGE UP (ref 135–146)
VIT B12 SERPL-MCNC: <150 PG/ML — LOW (ref 232–1245)
WBC # BLD: 10.68 K/UL — SIGNIFICANT CHANGE UP (ref 4.8–10.8)
WBC # FLD AUTO: 10.68 K/UL — SIGNIFICANT CHANGE UP (ref 4.8–10.8)

## 2022-12-20 PROCEDURE — 99232 SBSQ HOSP IP/OBS MODERATE 35: CPT

## 2022-12-20 RX ORDER — MAGNESIUM SULFATE 500 MG/ML
2 VIAL (ML) INJECTION ONCE
Refills: 0 | Status: COMPLETED | OUTPATIENT
Start: 2022-12-20 | End: 2022-12-20

## 2022-12-20 RX ORDER — FOLIC ACID 0.8 MG
1 TABLET ORAL DAILY
Refills: 0 | Status: DISCONTINUED | OUTPATIENT
Start: 2022-12-20 | End: 2022-12-21

## 2022-12-20 RX ORDER — PREGABALIN 225 MG/1
1000 CAPSULE ORAL DAILY
Refills: 0 | Status: DISCONTINUED | OUTPATIENT
Start: 2022-12-20 | End: 2022-12-21

## 2022-12-20 RX ADMIN — Medication 650 MILLIGRAM(S): at 05:30

## 2022-12-20 RX ADMIN — Medication 20 MILLIGRAM(S): at 05:29

## 2022-12-20 RX ADMIN — Medication 10 MILLIGRAM(S): at 11:50

## 2022-12-20 RX ADMIN — LIDOCAINE 1 PATCH: 4 CREAM TOPICAL at 05:28

## 2022-12-20 RX ADMIN — LIDOCAINE 1 PATCH: 4 CREAM TOPICAL at 11:22

## 2022-12-20 RX ADMIN — CARVEDILOL PHOSPHATE 3.12 MILLIGRAM(S): 80 CAPSULE, EXTENDED RELEASE ORAL at 05:29

## 2022-12-20 RX ADMIN — LIDOCAINE 1 PATCH: 4 CREAM TOPICAL at 17:55

## 2022-12-20 RX ADMIN — PANTOPRAZOLE SODIUM 40 MILLIGRAM(S): 20 TABLET, DELAYED RELEASE ORAL at 05:29

## 2022-12-20 RX ADMIN — Medication 650 MILLIGRAM(S): at 06:30

## 2022-12-20 RX ADMIN — ENOXAPARIN SODIUM 40 MILLIGRAM(S): 100 INJECTION SUBCUTANEOUS at 11:22

## 2022-12-20 RX ADMIN — LACTULOSE 30 GRAM(S): 10 SOLUTION ORAL at 23:30

## 2022-12-20 RX ADMIN — Medication 25 GRAM(S): at 11:22

## 2022-12-20 RX ADMIN — Medication 10 MILLIGRAM(S): at 11:21

## 2022-12-20 RX ADMIN — SPIRONOLACTONE 50 MILLIGRAM(S): 25 TABLET, FILM COATED ORAL at 05:29

## 2022-12-20 RX ADMIN — CARVEDILOL PHOSPHATE 3.12 MILLIGRAM(S): 80 CAPSULE, EXTENDED RELEASE ORAL at 17:57

## 2022-12-20 RX ADMIN — Medication 125 MICROGRAM(S): at 05:29

## 2022-12-20 NOTE — PROGRESS NOTE ADULT - SUBJECTIVE AND OBJECTIVE BOX
[place]  [facility] [address] ([facility]      Patient is a 72y old  Male who presents with a chief complaint of Fall (19 Dec 2022 14:54)        Interval events:  Patient seen and examined at bedside. No acute events overnight. Pain stable. Had BM last night. No other complaints.     -PMHx: Cirrhosis    Hypothyroidism    GERD (gastroesophageal reflux disease)      -PSHx:        REVIEW OF SYSTEMS:  CONSTITUTIONAL: No fever, weight loss, or fatigue  RESPIRATORY: No cough, wheezing, chills or hemoptysis; No shortness of breath  CARDIOVASCULAR: No chest pain, palpitations, dizziness, or leg swelling  GASTROINTESTINAL: No abdominal or epigastric pain. No nausea, vomiting, or hematemesis; No diarrhea or constipation. No melena or hematochezia.  NEUROLOGICAL: No headaches  LYMPH NODES: No enlarged glands  MUSCULOSKELETAL: No joint pain or swelling; No muscle, back, or extremity pain      T(C): , Max: 36.9 (12-20-22 @ 08:38)  HR: 86 (12-20-22 @ 13:25)  BP: 136/63 (12-20-22 @ 13:25)  RR: 18 (12-20-22 @ 13:25)  SpO2: 94% (12-20-22 @ 13:25)  CAPILLARY BLOOD GLUCOSE          PHYSICAL EXAM:  GENERAL: NAD, lying in bed    HEAD:  Atraumatic, Normocephalic  EYES: EOMI, PERRLA, conjunctiva and sclera clear  ENT: Moist mucous membranes  NECK: Supple, No JVD  CHEST/LUNG: Clear to auscultation bilaterally; No rales, rhonchi, wheezing, or rubs. Unlabored respirations; Lidoderm patch on left chest wall.   HEART: Regular rate and rhythm; No murmurs, rubs, or gallops   ABDOMEN: Bowel sounds present; Soft, Nontender, Nondistended. No hepatomegaly  EXTREMITIES:  2+ Peripheral Pulses, brisk capillary refill. 1+ b/l LE edema. Right arm in sling, cast on left wrist.   NERVOUS SYSTEM:  Alert & Oriented X3, speech clear. No deficits   SKIN: No rashes or lesions        LABS:          12.9  10.68  )-------(109          35.9  N=74.3  L=9.5  MCV=99.4    136|100|13  ------------------<105<H>  4.0|26|0.6<L>  eGFR:--  Ca:8.3<L>      PT/INR - ( 19 Dec 2022 06:34 )   PT: 14.90 sec;   INR: 1.30 ratio               Microbiology:      RADIOLOGY & ADDITIONAL TESTS:        Medications:  acetaminophen     Tablet .. 650 milliGRAM(s) Oral every 6 hours PRN  carvedilol 3.125 milliGRAM(s) Oral every 12 hours  enoxaparin Injectable 40 milliGRAM(s) SubCutaneous every 24 hours  furosemide    Tablet 20 milliGRAM(s) Oral daily  guaifenesin/dextromethorphan Oral Liquid 10 milliLiter(s) Oral once  lactulose Syrup 30 Gram(s) Oral three times a day  levothyroxine 125 MICROGram(s) Oral daily  lidocaine   4% Patch 1 Patch Transdermal daily  pantoprazole    Tablet 40 milliGRAM(s) Oral before breakfast  rifAXIMin 550 milliGRAM(s) Oral two times a day  spironolactone 50 milliGRAM(s) Oral daily      
  [place] EUGEN[place]  [facility] [address] ([facility])      Patient is a 72y old  Male who presents with a chief complaint of Fall (17 Dec 2022 08:16)        Interval events:  Patient seen and examined at bedside. C/o pain b/l arms. No sob.     -PMHx: Cirrhosis    Hypothyroidism    GERD (gastroesophageal reflux disease)      -PSHx:        REVIEW OF SYSTEMS:  CONSTITUTIONAL: No fever, weight loss, or fatigue  RESPIRATORY: No cough, wheezing, chills or hemoptysis; No shortness of breath  CARDIOVASCULAR: No chest pain, palpitations, dizziness, or leg swelling  GASTROINTESTINAL: No abdominal or epigastric pain. No nausea, vomiting, or hematemesis; No diarrhea or constipation. No melena or hematochezia.  NEUROLOGICAL: No headaches  LYMPH NODES: No enlarged glands  MUSCULOSKELETAL: as above       T(C): , Max: 37.3 (12-17-22 @ 20:22)  HR: 76 (12-18-22 @ 09:00)  BP: 120/55 (12-18-22 @ 09:00)  RR: 18 (12-18-22 @ 09:00)  SpO2: 94% (12-18-22 @ 09:00)  CAPILLARY BLOOD GLUCOSE        PHYSICAL EXAM:  GENERAL: NAD, lying in bed    HEAD:  Atraumatic, Normocephalic  EYES: EOMI, PERRLA, conjunctiva and sclera clear  ENT: Moist mucous membranes  NECK: Supple, No JVD  CHEST/LUNG: Clear to auscultation bilaterally; No rales, rhonchi, wheezing, or rubs. Unlabored respirations  HEART: Regular rate and rhythm; No murmurs, rubs, or gallops   ABDOMEN: Bowel sounds present; Soft, Nontender, Nondistended. No hepatomegaly  EXTREMITIES:  2+ Peripheral Pulses, brisk capillary refill. 1+ b/l LE edema. Right arm in sling, cast on left wrist.   NERVOUS SYSTEM:  Alert & Oriented X3, speech clear. No deficits   SKIN: No rashes or lesions      LABS:          13.6  9.79  )-------(89          39.9  N=74.4  L=10.7  PWL=810.0    137|98|12  ------------------<192<H>  4.4|27|0.7  eGFR:--  Ca:8.4      PT/INR - ( 16 Dec 2022 21:14 )   PT: 14.80 sec;   INR: 1.29 ratio         PTT - ( 16 Dec 2022 21:14 )  PTT:33.9 sec      Microbiology:      RADIOLOGY & ADDITIONAL TESTS:  < from: Xray Wrist 3 Views, Left (12.17.22 @ 10:27) >  FINDINGS/  IMPRESSION:    There is again an acute comminuted fracture of the distal radius.   Alignment appears improved compared to prior exam.    An acute minimally displaced fracture of the distal ulna is again noted.   No new fracture is seen. The wrist is splinted.    < end of copied text >    < from: Xray Humerus, Right (12.16.22 @ 22:38) >  Impression:  Right humeral surgical neck acute fracture.    < end of copied text >        Medications:  acetaminophen     Tablet .. 650 milliGRAM(s) Oral every 6 hours PRN  carvedilol 3.125 milliGRAM(s) Oral every 12 hours  enoxaparin Injectable 40 milliGRAM(s) SubCutaneous every 24 hours  furosemide    Tablet 20 milliGRAM(s) Oral daily  ketorolac   Injectable 10 milliGRAM(s) IV Push once  lactulose Syrup 30 Gram(s) Oral three times a day  levothyroxine 125 MICROGram(s) Oral daily  pantoprazole    Tablet 40 milliGRAM(s) Oral before breakfast  rifAXIMin 550 milliGRAM(s) Oral two times a day  spironolactone 50 milliGRAM(s) Oral daily      
  [place]  [facility] [address] ([facility]      Patient is a 72y old  Male who presents with a chief complaint of Fall (18 Dec 2022 12:47)        Interval events:  Patient seen and examined at bedside. Had some chest pain last night, EKG unremarkable, troponin negative, pain gone after putting lidoderm patch on. No other complaints.     -PMHx: Cirrhosis    Hypothyroidism    GERD (gastroesophageal reflux disease)      -PSHx:        REVIEW OF SYSTEMS:  CONSTITUTIONAL: No fever, weight loss, or fatigue  RESPIRATORY: No cough, wheezing, chills or hemoptysis; No shortness of breath  CARDIOVASCULAR: No chest pain, palpitations, dizziness, or leg swelling  GASTROINTESTINAL: No abdominal or epigastric pain. No nausea, vomiting, or hematemesis; No diarrhea or constipation. No melena or hematochezia.  NEUROLOGICAL: No headaches  LYMPH NODES: No enlarged glands  MUSCULOSKELETAL: No joint pain or swelling; No muscle, back, or extremity pain      T(C): , Max: 36.9 (12-19-22 @ 01:00)  HR: 87 (12-19-22 @ 13:07)  BP: 116/55 (12-19-22 @ 10:10)  RR: 18 (12-19-22 @ 13:07)  SpO2: 95% (12-19-22 @ 13:07)  CAPILLARY BLOOD GLUCOSE          PHYSICAL EXAM:  GENERAL: NAD, lying in bed    HEAD:  Atraumatic, Normocephalic  EYES: EOMI, PERRLA, conjunctiva and sclera clear  ENT: Moist mucous membranes  NECK: Supple, No JVD  CHEST/LUNG: Clear to auscultation bilaterally; No rales, rhonchi, wheezing, or rubs. Unlabored respirations; Lidoderm patch on left chest wall.   HEART: Regular rate and rhythm; No murmurs, rubs, or gallops   ABDOMEN: Bowel sounds present; Soft, Nontender, Nondistended. No hepatomegaly  EXTREMITIES:  2+ Peripheral Pulses, brisk capillary refill. 1+ b/l LE edema. Right arm in sling, cast on left wrist.   NERVOUS SYSTEM:  Alert & Oriented X3, speech clear. No deficits   SKIN: No rashes or lesions      LABS:          13.9  10.31  )-------(105          38.9  N=73.6  L=9.5  MCV=99.2    137|99|13  ------------------<140<H>  3.9|29|0.8  eGFR:--  Ca:8.7      PT/INR - ( 19 Dec 2022 06:34 )   PT: 14.90 sec;   INR: 1.30 ratio               Microbiology:      RADIOLOGY & ADDITIONAL TESTS:        Medications:  acetaminophen     Tablet .. 650 milliGRAM(s) Oral every 6 hours PRN  carvedilol 3.125 milliGRAM(s) Oral every 12 hours  enoxaparin Injectable 40 milliGRAM(s) SubCutaneous every 24 hours  furosemide    Tablet 20 milliGRAM(s) Oral daily  ketorolac   Injectable 10 milliGRAM(s) IV Push once  lactulose Syrup 30 Gram(s) Oral three times a day  levothyroxine 125 MICROGram(s) Oral daily  pantoprazole    Tablet 40 milliGRAM(s) Oral before breakfast  rifAXIMin 550 milliGRAM(s) Oral two times a day  spironolactone 50 milliGRAM(s) Oral daily

## 2022-12-20 NOTE — PROGRESS NOTE ADULT - TIME BILLING
Total time spent to complete patient's bedside assessment, physical examination, review medical chart including labs & imaging, discuss medical plan of care with housestaff was more than 25 minutes
Total time spent to complete patient's bedside assessment, physical examination, review medical chart including labs & imaging, discuss medical plan of care with housestaff was more than 25 minutes
Total time spent to complete patient's bedside assessment, physical examination, review medical chart including labs & imaging, discuss medical plan of care with housestaff was more than 35 minutes

## 2022-12-20 NOTE — PROGRESS NOTE ADULT - ASSESSMENT
72 year old male with PMH of HTN, Alcoholic liver cirrhosis and hypothyroidism, presented to ED for a mechanical fall. Found to have right humerus fracture and left wrist Colles fracture s/p reduction x2.     #Mechanical Fall    #Right Humerus Fracture s/p sling  #Left distal Radius fracture: s/p reduction in the ED and splint.   -s/p R-reduction by orthopedic x2  -Outpatient follow up with orthopedic.     #Compensated Alcoholic liver Cirrhosis:   #History of Hepatic Encephalopathy.   -No ascites, mild thrombocytopenia.   -Resume Home Aldactone, Lasix and Lactulose.   -Continue Coreg.     #Suspected b12 deficiency  #Suspected folic acid deficiency  -send MMA   -start folic acid and cyanocobalamin     #Hypothyroidism: Continue Synthroid.    #DVT PPx: LVX 40mg sq qhs    #Progress Note Handoff  Pending (specify): Placement  Family discussion: Plan of care discussed with patient, aware and agreeable   Disposition: [facility] pending auth; anticipate

## 2022-12-21 ENCOUNTER — TRANSCRIPTION ENCOUNTER (OUTPATIENT)
Age: 72
End: 2022-12-21

## 2022-12-21 VITALS — TEMPERATURE: 98 F | DIASTOLIC BLOOD PRESSURE: 72 MMHG | SYSTOLIC BLOOD PRESSURE: 136 MMHG | HEART RATE: 53 BPM

## 2022-12-21 LAB
ALBUMIN SERPL ELPH-MCNC: 2.7 G/DL — LOW (ref 3.5–5.2)
ALP SERPL-CCNC: 103 U/L — SIGNIFICANT CHANGE UP (ref 30–115)
ALT FLD-CCNC: 22 U/L — SIGNIFICANT CHANGE UP (ref 0–41)
ANION GAP SERPL CALC-SCNC: 10 MMOL/L — SIGNIFICANT CHANGE UP (ref 7–14)
AST SERPL-CCNC: 22 U/L — SIGNIFICANT CHANGE UP (ref 0–41)
BASOPHILS # BLD AUTO: 0.06 K/UL — SIGNIFICANT CHANGE UP (ref 0–0.2)
BASOPHILS NFR BLD AUTO: 0.5 % — SIGNIFICANT CHANGE UP (ref 0–1)
BILIRUB SERPL-MCNC: 1.1 MG/DL — SIGNIFICANT CHANGE UP (ref 0.2–1.2)
BUN SERPL-MCNC: 15 MG/DL — SIGNIFICANT CHANGE UP (ref 10–20)
CALCIUM SERPL-MCNC: 8.3 MG/DL — LOW (ref 8.4–10.4)
CHLORIDE SERPL-SCNC: 100 MMOL/L — SIGNIFICANT CHANGE UP (ref 98–110)
CO2 SERPL-SCNC: 27 MMOL/L — SIGNIFICANT CHANGE UP (ref 17–32)
CREAT SERPL-MCNC: 0.7 MG/DL — SIGNIFICANT CHANGE UP (ref 0.7–1.5)
EGFR: 99 ML/MIN/1.73M2 — SIGNIFICANT CHANGE UP
EOSINOPHIL # BLD AUTO: 0.43 K/UL — SIGNIFICANT CHANGE UP (ref 0–0.7)
EOSINOPHIL NFR BLD AUTO: 3.7 % — SIGNIFICANT CHANGE UP (ref 0–8)
GLUCOSE SERPL-MCNC: 120 MG/DL — HIGH (ref 70–99)
HCT VFR BLD CALC: 39.1 % — LOW (ref 42–52)
HGB BLD-MCNC: 14 G/DL — SIGNIFICANT CHANGE UP (ref 14–18)
IMM GRANULOCYTES NFR BLD AUTO: 0.5 % — HIGH (ref 0.1–0.3)
LYMPHOCYTES # BLD AUTO: 0.8 K/UL — LOW (ref 1.2–3.4)
LYMPHOCYTES # BLD AUTO: 6.9 % — LOW (ref 20.5–51.1)
MAGNESIUM SERPL-MCNC: 1.9 MG/DL — SIGNIFICANT CHANGE UP (ref 1.8–2.4)
MCHC RBC-ENTMCNC: 35.8 G/DL — SIGNIFICANT CHANGE UP (ref 32–37)
MCHC RBC-ENTMCNC: 35.8 PG — HIGH (ref 27–31)
MCV RBC AUTO: 100 FL — HIGH (ref 80–94)
MONOCYTES # BLD AUTO: 1.36 K/UL — HIGH (ref 0.1–0.6)
MONOCYTES NFR BLD AUTO: 11.8 % — HIGH (ref 1.7–9.3)
NEUTROPHILS # BLD AUTO: 8.86 K/UL — HIGH (ref 1.4–6.5)
NEUTROPHILS NFR BLD AUTO: 76.6 % — HIGH (ref 42.2–75.2)
NRBC # BLD: 0 /100 WBCS — SIGNIFICANT CHANGE UP (ref 0–0)
PLATELET # BLD AUTO: 114 K/UL — LOW (ref 130–400)
POTASSIUM SERPL-MCNC: 4.2 MMOL/L — SIGNIFICANT CHANGE UP (ref 3.5–5)
POTASSIUM SERPL-SCNC: 4.2 MMOL/L — SIGNIFICANT CHANGE UP (ref 3.5–5)
PROT SERPL-MCNC: 7.1 G/DL — SIGNIFICANT CHANGE UP (ref 6–8)
RBC # BLD: 3.91 M/UL — LOW (ref 4.7–6.1)
RBC # FLD: 13.4 % — SIGNIFICANT CHANGE UP (ref 11.5–14.5)
SODIUM SERPL-SCNC: 137 MMOL/L — SIGNIFICANT CHANGE UP (ref 135–146)
WBC # BLD: 11.57 K/UL — HIGH (ref 4.8–10.8)
WBC # FLD AUTO: 11.57 K/UL — HIGH (ref 4.8–10.8)

## 2022-12-21 PROCEDURE — 99239 HOSP IP/OBS DSCHRG MGMT >30: CPT

## 2022-12-21 RX ORDER — FOLIC ACID 0.8 MG
1 TABLET ORAL
Qty: 30 | Refills: 0
Start: 2022-12-21 | End: 2023-01-19

## 2022-12-21 RX ORDER — LIDOCAINE 4 G/100G
1 CREAM TOPICAL
Qty: 0 | Refills: 0 | DISCHARGE
Start: 2022-12-21

## 2022-12-21 RX ORDER — ACETAMINOPHEN 500 MG
2 TABLET ORAL
Qty: 0 | Refills: 0 | DISCHARGE
Start: 2022-12-21

## 2022-12-21 RX ORDER — PREGABALIN 225 MG/1
1 CAPSULE ORAL
Qty: 30 | Refills: 0
Start: 2022-12-21 | End: 2023-01-19

## 2022-12-21 RX ORDER — MORPHINE SULFATE 50 MG/1
2 CAPSULE, EXTENDED RELEASE ORAL ONCE
Refills: 0 | Status: DISCONTINUED | OUTPATIENT
Start: 2022-12-21 | End: 2022-12-21

## 2022-12-21 RX ORDER — KETOROLAC TROMETHAMINE 30 MG/ML
15 SYRINGE (ML) INJECTION ONCE
Refills: 0 | Status: DISCONTINUED | OUTPATIENT
Start: 2022-12-21 | End: 2022-12-21

## 2022-12-21 RX ADMIN — SPIRONOLACTONE 50 MILLIGRAM(S): 25 TABLET, FILM COATED ORAL at 05:19

## 2022-12-21 RX ADMIN — Medication 650 MILLIGRAM(S): at 05:21

## 2022-12-21 RX ADMIN — PANTOPRAZOLE SODIUM 40 MILLIGRAM(S): 20 TABLET, DELAYED RELEASE ORAL at 05:19

## 2022-12-21 RX ADMIN — ENOXAPARIN SODIUM 40 MILLIGRAM(S): 100 INJECTION SUBCUTANEOUS at 11:46

## 2022-12-21 RX ADMIN — Medication 20 MILLIGRAM(S): at 05:20

## 2022-12-21 RX ADMIN — PREGABALIN 1000 MICROGRAM(S): 225 CAPSULE ORAL at 11:46

## 2022-12-21 RX ADMIN — Medication 15 MILLIGRAM(S): at 11:00

## 2022-12-21 RX ADMIN — LIDOCAINE 1 PATCH: 4 CREAM TOPICAL at 11:47

## 2022-12-21 RX ADMIN — Medication 125 MICROGRAM(S): at 05:20

## 2022-12-21 RX ADMIN — Medication 1 MILLIGRAM(S): at 11:46

## 2022-12-21 RX ADMIN — CARVEDILOL PHOSPHATE 3.12 MILLIGRAM(S): 80 CAPSULE, EXTENDED RELEASE ORAL at 05:20

## 2022-12-21 RX ADMIN — Medication 15 MILLIGRAM(S): at 10:41

## 2022-12-21 NOTE — DISCHARGE NOTE NURSING/CASE MANAGEMENT/SOCIAL WORK - NSDCPEFALRISK_GEN_ALL_CORE
For information on Fall & Injury Prevention, visit: [email or web address] OR  [email or web address] OR  https://www.cdc.gov/steadi/patient.html

## 2022-12-21 NOTE — DISCHARGE NOTE PROVIDER - NSDCCPCAREPLAN_GEN_ALL_CORE_FT
PRINCIPAL DISCHARGE DIAGNOSIS  Diagnosis: Colles' fracture  Assessment and Plan of Treatment: You presented to us after a fall and injury to the right upper arm and left wrist. xray was performed and fracture was reduced by orthopedic surgeons and managed non operatively. Please follow-up with orthopedics as outpatient in 1 week.   Orthopaedic office with Dr. Seo, please call [phone] to schedule an appointment      SECONDARY DISCHARGE DIAGNOSES  Diagnosis: Fracture of right humerus  Assessment and Plan of Treatment:     Diagnosis: Inadequate social support  Assessment and Plan of Treatment:

## 2022-12-21 NOTE — DISCHARGE NOTE PROVIDER - NSDCPNSUBOBJ_GEN_ALL_CORE
Pt was seen and examined at the bedside. reports pain in the arm.  pt for discharge today to NH.      Vital Signs Last 24 Hrs  T(C): 36.6 (21 Dec 2022 14:08), Max: 36.6 (21 Dec 2022 14:08)  T(F): 97.9 (21 Dec 2022 14:08), Max: 97.9 (21 Dec 2022 14:08)  HR: 53 (21 Dec 2022 14:08) (53 - 82)  BP: 136/72 (21 Dec 2022 14:08) (122/61 - 137/75)  BP(mean): --  RR: 18 (21 Dec 2022 04:43) (18 - 18)  SpO2: 94% (21 Dec 2022 04:43) (94% - 96%)

## 2022-12-21 NOTE — DISCHARGE NOTE PROVIDER - HOSPITAL COURSE
72 year old male with PMH of HTN, Alcoholic liver cirrhosis and hypothyroidism, presented to ED for a mechanical fall. Found to have right humerus fracture and left wrist Colles fracture s/p reduction x2.     Patient admitted after Mechanical Fall had Right Humerus Fracture s/p sling and Left distal Radius fracture: s/p reduction in the ED and splint. S/p R-reduction by orthopedic x2. Outpatient follow up with orthopedic.     Compensated Alcoholic liver Cirrhosis: History of Hepatic Encephalopathy. No ascites, mild thrombocytopenia. Resume Home Aldactone, Lasix and Lactulose. Continue Coreg.

## 2022-12-21 NOTE — DISCHARGE NOTE PROVIDER - NSDCMRMEDTOKEN_GEN_ALL_CORE_FT
B-12 1000 mcg oral tablet: 1 tab(s) orally once a day  carvedilol 3.125 mg oral tablet: 1 tab(s) orally 2 times a day  Enulose 10 g/15 mL oral and rectal liquid: 30 milliliter(s) oral and rectal 3 times a day  folic acid 1 mg oral tablet: 1 tab(s) orally once a day  furosemide 20 mg oral tablet: 1 tab(s) orally once a day  levothyroxine 125 mcg (0.125 mg) oral tablet: 1 tab(s) orally once a day  pantoprazole 20 mg oral delayed release tablet: 1 tab(s) orally once a day  spironolactone 50 mg oral tablet: 1 tab(s) orally once a day  Xifaxan 550 mg oral tablet: 1 tab(s) orally 2 times a day   acetaminophen 325 mg oral tablet: 2 tab(s) orally every 6 hours, As needed, Mild Pain (1 - 3), Moderate Pain (4 - 6)  B-12 1000 mcg oral tablet: 1 tab(s) orally once a day  carvedilol 3.125 mg oral tablet: 1 tab(s) orally 2 times a day  Enulose 10 g/15 mL oral and rectal liquid: 30 milliliter(s) oral and rectal 3 times a day  folic acid 1 mg oral tablet: 1 tab(s) orally once a day  furosemide 20 mg oral tablet: 1 tab(s) orally once a day  levothyroxine 125 mcg (0.125 mg) oral tablet: 1 tab(s) orally once a day  lidocaine 4% topical film: Apply topically to affected area once a day  pantoprazole 20 mg oral delayed release tablet: 1 tab(s) orally once a day  spironolactone 50 mg oral tablet: 1 tab(s) orally once a day  Xifaxan 550 mg oral tablet: 1 tab(s) orally 2 times a day

## 2022-12-21 NOTE — DISCHARGE NOTE PROVIDER - CARE PROVIDER_API CALL
Ashlyn Recinos (MD)  Orthopaedic Surgery; Surgery of the Hand  [address] [place] [address]  Phone: [phone]  Fax: [phone]  Follow Up Time: 1 week

## 2022-12-21 NOTE — DISCHARGE NOTE NURSING/CASE MANAGEMENT/SOCIAL WORK - PATIENT PORTAL LINK FT
You can access the FollowMyHealth Patient Portal offered by [facility] by registering at the following website: [email or web address]. By joining [facility]’s FollowMyHealth portal, you will also be able to view your health information using other applications (apps) compatible with our system.

## 2022-12-25 LAB — METHYLMALONATE SERPL-SCNC: 3127 NMOL/L — HIGH (ref 0–378)

## 2022-12-29 DIAGNOSIS — F10.21 ALCOHOL DEPENDENCE, IN REMISSION: ICD-10-CM

## 2022-12-29 DIAGNOSIS — S52.532A COLLES' FRACTURE OF LEFT RADIUS, INITIAL ENCOUNTER FOR CLOSED FRACTURE: ICD-10-CM
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
## 2022-12-29 DIAGNOSIS — K76.6 PORTAL HYPERTENSION: ICD-10-CM

## 2022-12-29 DIAGNOSIS — S09.90XA UNSPECIFIED INJURY OF HEAD, INITIAL ENCOUNTER: ICD-10-CM

## 2022-12-29 DIAGNOSIS — K70.30 ALCOHOLIC CIRRHOSIS OF LIVER WITHOUT ASCITES: ICD-10-CM

## 2022-12-29 DIAGNOSIS — Z87.891 PERSONAL HISTORY OF NICOTINE DEPENDENCE: ICD-10-CM

## 2022-12-29 DIAGNOSIS — I10 ESSENTIAL (PRIMARY) HYPERTENSION: ICD-10-CM

## 2022-12-29 DIAGNOSIS — M85.80 OTHER SPECIFIED DISORDERS OF BONE DENSITY AND STRUCTURE, UNSPECIFIED SITE: ICD-10-CM

## 2022-12-29 DIAGNOSIS — E53.8 DEFICIENCY OF OTHER SPECIFIED B GROUP VITAMINS: ICD-10-CM

## 2022-12-29 DIAGNOSIS — S42.211A UNSPECIFIED DISPLACED FRACTURE OF SURGICAL NECK OF RIGHT HUMERUS, INITIAL ENCOUNTER FOR CLOSED FRACTURE: ICD-10-CM

## 2022-12-29 DIAGNOSIS — E03.9 HYPOTHYROIDISM, UNSPECIFIED: ICD-10-CM

## 2022-12-29 DIAGNOSIS — D69.6 THROMBOCYTOPENIA, UNSPECIFIED: ICD-10-CM
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
## 2022-12-29 DIAGNOSIS — M84.68XA PATHOLOGICAL FRACTURE IN OTHER DISEASE, OTHER SITE, INITIAL ENCOUNTER FOR FRACTURE: ICD-10-CM

## 2022-12-30 PROBLEM — Z00.00 ENCOUNTER FOR PREVENTIVE HEALTH EXAMINATION: Status: ACTIVE | Noted: 2022-12-30

## 2023-01-12 ENCOUNTER — APPOINTMENT (OUTPATIENT)
Dept: ORTHOPEDIC SURGERY | Facility: ASSISTED LIVING FACILITY | Age: 73
End: 2023-01-12
Payer: MEDICARE

## 2023-01-12 PROBLEM — K74.60 UNSPECIFIED CIRRHOSIS OF LIVER: Chronic | Status: ACTIVE | Noted: 2022-12-16

## 2023-01-12 PROBLEM — K21.9 GASTRO-ESOPHAGEAL REFLUX DISEASE WITHOUT ESOPHAGITIS: Chronic | Status: ACTIVE | Noted: 2022-12-16

## 2023-01-12 PROBLEM — E03.9 HYPOTHYROIDISM, UNSPECIFIED: Chronic | Status: ACTIVE | Noted: 2022-12-16

## 2023-01-12 PROCEDURE — 99309 SBSQ NF CARE MODERATE MDM 30: CPT

## 2023-01-26 ENCOUNTER — APPOINTMENT (OUTPATIENT)
Dept: ORTHOPEDIC SURGERY | Facility: ASSISTED LIVING FACILITY | Age: 73
End: 2023-01-26
Payer: MEDICARE

## 2023-01-26 PROCEDURE — 99307 SBSQ NF CARE SF MDM 10: CPT

## 2023-02-09 ENCOUNTER — APPOINTMENT (OUTPATIENT)
Dept: ORTHOPEDIC SURGERY | Facility: ASSISTED LIVING FACILITY | Age: 73
End: 2023-02-09
Payer: MEDICARE

## 2023-02-09 PROCEDURE — 99309 SBSQ NF CARE MODERATE MDM 30: CPT

## 2023-10-03 NOTE — OCCUPATIONAL THERAPY INITIAL EVALUATION ADULT - LONG TERM MEMORY, REHAB EVAL
Occupational Therapy    Visit Type: discharge  SUBJECTIVE  Patient agreed to participate in therapy this date.  \"I just know when I'm tired. I can tell.\"  Patient / Family Goal: maximize function    OBJECTIVE     Cognitive Status   Level of Consciousness   - alert  Affect/Behavior    - cooperative and pleasant  Orientation    - Oriented to: person, place, time and situation    Vitals:  Pulse Ox (%): 98%  Heart Rate (beats per minute): 100  Blood Pressure (mmhg):      - Seated: 106/70; 107/78         Bed Mobility  - Sit to supine: modified independent  Transfers  Assistive devices: gait belt  - Sit to stand: modified independent  - Stand to sit: modified independent  - Stand pivot: modified independent      Activities of Daily Living (ADLs)  Eating:   - Assist: modified independent  - Position: wheelchair  Grooming/Oral Hygiene:   - Grooming assist: modified independent       - Oral hygiene assist: modified independent  - Position: standing at sink and sitting at sink  Upper Body Dressing:  - Assist: modified independent  - Position: wheelchair  Lower Body Dressing:   - Assist: modified independent  - Position: wheelchair  - Footwear:       - Assistance: modified independent       - Position: edge of bed       - Type: socks and tie shoe  Fasteners:   - Tying: modified independent  Toileting:   - Toilet transfer:        - Assist: modified independent       - Device: gait belt       - Equipment: grab bar use  - Assist: modified independent  - Equipment: grab bar use  Tub Transfer:   - Assist: modified independent  - Pattern: sitting transfer  - Equipment: shower transfer bench and grab bar  Bathing:   - Assist: modified independent  - Position: standing at sink and sitting at sink  OT engaged patient in simulated ADLs to reassess assistance required upon discharge. Patient met all MOD I goals for ADLs. OT recommends patient obtain TTB for safety and energy conservation during bathing d/t decreased cardiovascular  endurance. DME form was filled out by pt's OT/PT and received by MARCOS this AM.     Interventions  Therapeutic Exercise  To incr proximal core strength required for functional transfers, dynamic standing balance, and I/ADLs, OT provided patient with SBA to transfer from wc to quadruped on mat table. OT provided patient with visual demonstration of 'bird-dog' exercise to incr core strength and balance, and provided patient with SBA and verbal cues for task efficiency with patient's return demonstration. Patient completed 2 sets of 8 repetitions with intermittent rest breaks for muscular recovery. OT then facilitated glute strengthening exercises with patient positioned in quadruped -OT provided patient with verbal and tactile cues to perform glute/LE extension with pulses/isometric hold to incr strength in B LE (glutes, hamstrings) and core required for functional transfers. Patient required prolonged rest breaks during today's session for cardiovascular and muscular recovery.   Skilled input: verbal instruction/cues and tactile instruction/cues  Verbal Consent: Writer verbally educated and received verbal consent for hand placement, positioning of patient, and techniques to be performed today from patient for clothing adjustments for techniques, therapist position for techniques and hand placement and palpation for techniques as described above and how they are pertinent to the patient's plan of care.         ASSESSMENT  Impairments: balance, cardiovascular endurance, range of motion, strength, bed mobility, activity tolerance and aerobic capacity  Functional Limitations: bed mobility, functional mobility, grooming, toileting, IADLs, community reintegration, dressing, showering, job performance, participating in meaningful/purposeful activities, functional transfers, bathing and eating  Patient politely request to end session 30 minutes early d/t incr fatigue. OT provided patient with maximal encouragement to complete  therapy session with incr rest breaks, as well as educated patient on energy conservation and benefits of intensive therapy, however patient still requested to end early.     The patient has participated in skilled OT services on [facility] and at this time is being discharged to home with HH. Patient would benefit from cardiac rehab to incr cardiovascular endurance and health.  The patient's therapy goals were reassessed this date and the patient has met the goals.  The patient's equipment needs were addressed and it is recommended that patient obtain TTB (DME form filled out and received by MARCOS). Written information was given to patient on how to obtain said equipment. OT reviewed energy conservation techniques, body mechanics, and safety with IADLS and verbalized understanding. Patient with no further questions about discharge.    Recommendations from OT include energy conservation techniques, frequent mobilization to incr cardiovascular endurance.  The patient would benefit from continued OT to address cardiovascular endurance and IADLs with a good prognosis to meet future therapy goals. The plan has been discussed with the patient and the patient verbalized agreement.  Discharge inpatient rehabilitation OT.      Discharge Recommendations:  Recommendations for Discharge: OT IL: Patient requires  intermittent assistance to perform mobility and/or ADLs safely, Patient is appropriate for Occupational Therapy 1-3 times per week  PT/OT ADL Equipment for Discharge: shower chair vs. TTB; reacher  OT Identified Barriers to Discharge: decr cardiovascular endurance, lives alone, decr activity tolerance    Progress: goals met    Therapy Participation: This patient was unable to participate in -20 minutes of scheduled therapy with this therapist this session due to incr fatigue and patient request to go back to room despite maximal encouragement from therapist..    Education:   - Present and ready to learn: patient  Education  provided during session:  - Results of above outlined education: Verbalizes understanding    Patient at End of Session:   Location: in bed  Safety measures: alarm system in place/re-engaged, bed rails x4, call light within reach and lines intact  Handoff to: nurse    PLAN  Suggestions for next session as indicated: Frequency: 5-7 days per week  Frequency Comments: 45-90 min/day   Duration:  10/5/2023      Interventions: activity tolerance training, bed mobility training, community reintegration, energy conservation, functional transfer training, IADL, therapeutic activity, upper extremity strengthening/ROM, cognitive retraining, compensatory technique education, therapeutic exercise, use of adaptive equipment, work simplification, transfer training, fine motor coordination activities, HEP training, compensatory techniques, balance and body mechanics  Agreement to plan and goals: patient agrees with goals and treatment plan      GOALS  Review Date: 9/29/2023  Short Term Goals (STGs): to be met 7 days from date established, unless otherwise stated.  - Patient will complete self-feeding at modified Independent level with adaptive equipment as deemed appropriate. (met)  - Patient will complete grooming at stand by assist level with adaptive equipment as deemed appropriate.(met)  - Patient will complete bathing at stand by assist level with adaptive equipment as deemed appropriate.(met)  - Patient will complete upper body dressing at stand by assist level with adaptive equipment as deemed appropriate.(met)  - Patient will complete lower body dressing at contact guard assist level with adaptive equipment as deemed appropriate.(met)  - Patient will complete toileting at contact guard assist level with adaptive equipment as deemed appropriate.(met)  - Patient will complete toilet transfer at stand by assist level with adaptive equipment as deemed appropriate.(met)  - Patient will complete tub transfer at stand by assist  level with adaptive equipment as deemed appropriate.(met)  Status: all STGs met  Long Term Goals (LTGs): to be met by discharge from rehab program.  - Patient will complete self-feeding at modified Independent level with adaptive equipment as deemed appropriate. (met)  - Patient will complete grooming at modified Independent level with adaptive equipment as deemed appropriate. (met)  - Patient will complete bathing at modified Independent level with adaptive equipment as deemed appropriate.(met)  - Patient will complete upper body dressing at modified Independent level with adaptive equipment as deemed appropriate.(met)  - Patient will complete lower body dressing at modified Independent level with adaptive equipment as deemed appropriate.(met)  - Patient will complete toileting at modified Independent level with adaptive equipment as deemed appropriate.(met)  - Patient will complete toilet transfer at modified Independent level with adaptive equipment as deemed appropriate.(met)  - Patient will complete tub transfer at modified Independent level with adaptive equipment as deemed appropriate.(met)  Status: all LTGs met    Documented in the chart in the following areas: Assessment/Plan. Plan.      Therapy procedure time and total treatment time can be found documented on the Time Entry flowsheet   intact

## 2023-10-13 NOTE — DISCHARGE NOTE PROVIDER - [id number]_GEN_ALL_CORE
verbal instruction/written material This document is complete and the patient is ready for discharge.

## 2023-10-25 ENCOUNTER — APPOINTMENT (OUTPATIENT)
Dept: ORTHOPEDIC SURGERY | Facility: ASSISTED LIVING FACILITY | Age: 73
End: 2023-10-25
Payer: MEDICARE

## 2023-10-25 PROCEDURE — 99309 SBSQ NF CARE MODERATE MDM 30: CPT

## 2024-08-20 NOTE — ED PROCEDURE NOTE - CPROC ED INFORMED CONSENT1
Addended by: MUNIRA MIRELES on: 8/20/2024 07:13 PM     Modules accepted: Orders, Level of Service    
This was an emergent procedure and consent was implied.